# Patient Record
Sex: FEMALE | Race: OTHER | HISPANIC OR LATINO | URBAN - METROPOLITAN AREA
[De-identification: names, ages, dates, MRNs, and addresses within clinical notes are randomized per-mention and may not be internally consistent; named-entity substitution may affect disease eponyms.]

---

## 2020-03-31 ENCOUNTER — EMERGENCY (EMERGENCY)
Facility: HOSPITAL | Age: 30
LOS: 0 days | Discharge: HOME | End: 2020-04-01
Admitting: EMERGENCY MEDICINE
Payer: COMMERCIAL

## 2020-03-31 VITALS
DIASTOLIC BLOOD PRESSURE: 70 MMHG | SYSTOLIC BLOOD PRESSURE: 102 MMHG | TEMPERATURE: 101 F | HEART RATE: 128 BPM | RESPIRATION RATE: 20 BRPM | OXYGEN SATURATION: 100 %

## 2020-03-31 DIAGNOSIS — R07.89 OTHER CHEST PAIN: ICD-10-CM

## 2020-03-31 DIAGNOSIS — J06.9 ACUTE UPPER RESPIRATORY INFECTION, UNSPECIFIED: ICD-10-CM

## 2020-03-31 DIAGNOSIS — R05 COUGH: ICD-10-CM

## 2020-03-31 PROCEDURE — 99284 EMERGENCY DEPT VISIT MOD MDM: CPT

## 2020-03-31 RX ORDER — SODIUM CHLORIDE 9 MG/ML
1000 INJECTION, SOLUTION INTRAVENOUS ONCE
Refills: 0 | Status: COMPLETED | OUTPATIENT
Start: 2020-03-31 | End: 2020-03-31

## 2020-03-31 RX ORDER — ONDANSETRON 8 MG/1
4 TABLET, FILM COATED ORAL ONCE
Refills: 0 | Status: COMPLETED | OUTPATIENT
Start: 2020-03-31 | End: 2020-03-31

## 2020-03-31 RX ORDER — ACETAMINOPHEN 500 MG
975 TABLET ORAL ONCE
Refills: 0 | Status: COMPLETED | OUTPATIENT
Start: 2020-03-31 | End: 2020-03-31

## 2020-03-31 RX ADMIN — ONDANSETRON 4 MILLIGRAM(S): 8 TABLET, FILM COATED ORAL at 22:36

## 2020-03-31 RX ADMIN — SODIUM CHLORIDE 1000 MILLILITER(S): 9 INJECTION, SOLUTION INTRAVENOUS at 22:35

## 2020-03-31 RX ADMIN — Medication 975 MILLIGRAM(S): at 22:30

## 2020-03-31 RX ADMIN — SODIUM CHLORIDE 1000 MILLILITER(S): 9 INJECTION, SOLUTION INTRAVENOUS at 23:40

## 2020-03-31 NOTE — ED ADULT NURSE NOTE - OBJECTIVE STATEMENT
Pt with complaints of diarrhea more than a [place] ago and cough, fever, body aches, SOB and nausea/vomiting for a week.

## 2020-03-31 NOTE — ED ADULT NURSE NOTE - CINV DISCH TEACH PARTICIP
Shift assessment completed. Reviewed labs and plan of care. Patient is alert and oriented. Lungs are diminished. NSR on monitor, no murmurs. Bowel sounds active. No edema noted. Denies pain at this time. This RN at bedside for suicide precaution. Patient denies the want to hurt himself at this time, but is very tearful. Will continue to monitor closely. Patient

## 2020-04-01 VITALS
RESPIRATION RATE: 20 BRPM | OXYGEN SATURATION: 97 % | SYSTOLIC BLOOD PRESSURE: 108 MMHG | HEART RATE: 108 BPM | TEMPERATURE: 100 F | DIASTOLIC BLOOD PRESSURE: 65 MMHG

## 2020-04-01 PROCEDURE — 71045 X-RAY EXAM CHEST 1 VIEW: CPT | Mod: 26

## 2020-04-01 RX ORDER — ACETAMINOPHEN 500 MG
1 TABLET ORAL
Qty: 15 | Refills: 0
Start: 2020-04-01 | End: 2020-04-05

## 2020-04-01 RX ORDER — ONDANSETRON 8 MG/1
1 TABLET, FILM COATED ORAL
Qty: 10 | Refills: 0
Start: 2020-04-01 | End: 2020-04-05

## 2020-04-01 NOTE — ED PROVIDER NOTE - NS ED ROS FT
Constitutional: (+) fever  Eyes/ENT: (-) visual changes   Cardiovascular: (-) chest pain, (-) syncope  Respiratory: (+) cough, (+) shortness of breath  Gastrointestinal: (+) vomiting, (+) diarrhea  Genitourinary: (-) dysuria, (-) hesitancy, (-) frequency   Musculoskeletal: (-) neck pain, (-) back pain, (-) joint pain, body aches   Integumentary: (-) rash, (-) edema  Neurological: (-) headache, (-) altered mental status  Allergic/Immunologic: (-) pruritus

## 2020-04-01 NOTE — ED PROVIDER NOTE - CLINICAL SUMMARY MEDICAL DECISION MAKING FREE TEXT BOX
33 yo male presents with viral illness. 30 yo male presents with viral illness. 30 yo male presents with viral illness. chest xray shows for viral pna. pt states to feel better after zofran and fluids. pt ablen to tolerate PO. will send zofran to pharmacy for nausea. given strict return precautions.

## 2020-04-01 NOTE — ED PROVIDER NOTE - NSFOLLOWUPINSTRUCTIONS_ED_ALL_ED_FT
Please follow up with your primary care physician within 24-72 hours and return immediately if symptoms worsen.    Novel Coronavirus (COVID-19)  The Facts  What is a coronavirus?  Coronaviruses are a large family of viruses that cause illnesses ranging from the common cold  to more severe diseases such as Middle East Respiratory Syndrome (MERS) and Severe Acute  Respiratory Syndrome (SARS).  What is Novel Coronavirus (COVID-19)?  COVID-19 is a new strain of Coronavirus that has not been previously identified in humans. COVID-19  was identified in [place], [place], [place] in December 2019 (COVID-19). COVID-19 has  since been identified outside of [place], in a growing number of countries internationally, including  the [place].  Where can I find the most recent information about COVID-19?  The Centers for Disease Control and Prevention (CDC) is closely monitoring the outbreak caused by the  COVID-19. For the latest information about COVID- 19, visit the CDC website at  https://www.cdc.gov/coronavirus/index.html  How are coronaviruses spread?  Coronaviruses can be transmitted from person-to- person, usually after close contact with an infected person,  for example, in a household, workplace, or healthcare setting via droplets that become airborne after a cough  or sneeze by an affected person. These droplets can then infect a nearby person. It is likely transmission also  occurs by touching recently contaminated surfaces.  What are the symptoms of coronavirus infection?  It depends on the virus, but common signs include fever and/or respiratory symptoms such as  cough and shortness of breath. In more severe cases, infection can cause pneumonia, severe acute  respiratory syndrome, kidney failure and even death. Fortunately, most cases of COVID-19 have an  illness no different than the influenza “flu”. With a majority of these patients having mild symptoms  and overall mortality which appears to be not much different than the flu.  Is there a treatment for a COVID-19?  There is no specific treatment for disease caused by COVID-19. However, many of the symptoms can  be treated based on the patient’s clinical condition. Supportive care for infected persons can be highly  effective.  What can I do to protect myself?  Washing your hands, covering your cough, and disinfecting surfaces are the best precautionary  measures. It is also advisable to avoid close contact with anyone showing symptoms of respiratory  illness such as coughing and sneezing. Those with symptoms should wear a surgical mask when  around others.  What can I do to protect those around me?  If you have been identified as someone who may be infected with COVID-19, we recommend you  follow the self-isolation procedures outlined below to protect those around you and limit the spread  of this virus.   March 3, 2020  Recommendations for Patients Advised to Self-Isolate  for Possible COVID-19 Exposure  We recommend the below precautionary steps from now until 14 days from when you  returned from your travel or date of your last known possible contact:  - Do not go to work, school, or public areas. Avoid using public transportation, ride-sharing, or  taxis.  - As much as possible, separate yourself from other people in your home. If you can, you should  stay in a room and away from other people in your home. Also, you should use a separate  bathroom, if available.  - Wear the supplied mask whenever you are around other people.  - If you have a non-urgent medical appointment, please reschedule for a later date. If the  appointment is urgent, please call the healthcare provider and tell them that you are on selfisolation for possible COVID-19. This will help the healthcare provider’s office take steps to keep  other people from getting infected or exposed. If you can reschedule routine appointments, do  so.  - Wash your hands often with soap and water for at least 15 to 20 seconds or clean your hands  with an alcohol-based hand  that contains 60 to 95% alcohol, covering all surfaces of  your hands and rubbing them together until they feel dry. Soap and water should be used  preferentially if hands are visibly dirty.  - Cover your mouth and nose with a tissue when you cough or sneeze. Throw used tissues in a  lined trash can; immediately wash your hands.  - Avoid touching your eyes, nose, and mouth with your hands.  - Avoid sharing personal household items. You should not share dishes, drinking glasses, cups,  eating utensils, towels, or bedding with other people or pets in your home. After using these  items, they should be washed thoroughly with soap and water.  - Clean and disinfect all “high-touch” surfaces every day. High touch surfaces include counters,  tabletops, doorknobs, light switches, remote controls, bathroom fixtures, toilets, phones,  keyboards, tablets, and bedside tables. Also, clean any surfaces that may have blood, stool, or  body fluids on them.       If you develop worsening symptoms:  - If you develop worsening symptoms, such as severe shortness of breath, please call [phone] option #[phone]. They will assist you in determining your next steps.  During your time on self-isolation do the following:  - Work from home if you are able to so.  - Limit social isolation by talking with friends and family on the phone or with face-time  - Talk with friends and relatives who don’t live with you about supporting each other if one  household has to be quarantined. For example, agree to drop groceries or other supplies at the  front door.  - Exercise and spend time outdoors away from others if able to do so.    Why didn’t I get tested for novel coronavirus (COVID-19)?  The number of available tests is very limited so strict rules exist for who is allowed to be tested.  [facility] has been authorized to perform testing and is currently working hard to be  able to start providing the test. Such testing is currently reserved for patients who have had  contact with someone infected with the virus, or those who are very sick a plus those who have  traveled to areas identified by the Centers for Disease Control and Prevention (CD) and will  require hospitalization.  What should I do now?  If you are well enough to be discharged home and are not in a high risk group to have  contracted the COVID-10, you should care for yourself at home exactly like you would if you  have Influenza “flu”. Follow all the standard guidelines about washing your hands, covering  your cough, etc.  You should return to the Emergency Department if you develop worse symptoms, trouble  breathing, chest pain, and/or a fever that doesn’t improve with over the counter  acetaminophen or ibuprofen.

## 2020-04-01 NOTE — ED PROVIDER NOTE - PATIENT PORTAL LINK FT
You can access the FollowMyHealth Patient Portal offered by [facility] by registering at the following website: [email or web address]. By joining [employer]’s FollowMyHealth portal, you will also be able to view your health information using other applications (apps) compatible with our system.

## 2020-04-01 NOTE — ED PROVIDER NOTE - OBJECTIVE STATEMENT
28 yo female with no pertinent pmh presents c/o fever/cough/SOB/n/v/d for one week. pt states to have body aches and generalized chest pain only with cough. pt state to have a contact with a COVID+ individual. NBNB emesis and NB stools. denies any other symptoms including atypical headache, visual changes, recent illness/travel, or abdominal pain.

## 2020-12-19 ENCOUNTER — EMERGENCY (EMERGENCY)
Facility: HOSPITAL | Age: 30
LOS: 0 days | Discharge: HOME | End: 2020-12-20
Attending: EMERGENCY MEDICINE | Admitting: EMERGENCY MEDICINE
Payer: COMMERCIAL

## 2020-12-19 VITALS
SYSTOLIC BLOOD PRESSURE: 126 MMHG | TEMPERATURE: 98 F | OXYGEN SATURATION: 98 % | WEIGHT: 184.97 LBS | DIASTOLIC BLOOD PRESSURE: 60 MMHG | HEART RATE: 93 BPM | RESPIRATION RATE: 16 BRPM

## 2020-12-19 DIAGNOSIS — R10.32 LEFT LOWER QUADRANT PAIN: ICD-10-CM

## 2020-12-19 DIAGNOSIS — R10.9 UNSPECIFIED ABDOMINAL PAIN: ICD-10-CM

## 2020-12-19 DIAGNOSIS — K82.9 DISEASE OF GALLBLADDER, UNSPECIFIED: ICD-10-CM

## 2020-12-19 LAB
ALBUMIN SERPL ELPH-MCNC: 4.2 G/DL — SIGNIFICANT CHANGE UP (ref 3.5–5.2)
ALP SERPL-CCNC: 45 U/L — SIGNIFICANT CHANGE UP (ref 30–115)
ALT FLD-CCNC: 14 U/L — SIGNIFICANT CHANGE UP (ref 0–41)
ANION GAP SERPL CALC-SCNC: 12 MMOL/L — SIGNIFICANT CHANGE UP (ref 7–14)
APPEARANCE UR: CLEAR — SIGNIFICANT CHANGE UP
AST SERPL-CCNC: 18 U/L — SIGNIFICANT CHANGE UP (ref 0–41)
BACTERIA # UR AUTO: NEGATIVE — SIGNIFICANT CHANGE UP
BASOPHILS # BLD AUTO: 0.03 K/UL — SIGNIFICANT CHANGE UP (ref 0–0.2)
BASOPHILS NFR BLD AUTO: 0.3 % — SIGNIFICANT CHANGE UP (ref 0–1)
BILIRUB SERPL-MCNC: <0.2 MG/DL — SIGNIFICANT CHANGE UP (ref 0.2–1.2)
BILIRUB UR-MCNC: NEGATIVE — SIGNIFICANT CHANGE UP
BUN SERPL-MCNC: 10 MG/DL — SIGNIFICANT CHANGE UP (ref 10–20)
CALCIUM SERPL-MCNC: 9.5 MG/DL — SIGNIFICANT CHANGE UP (ref 8.5–10.1)
CHLORIDE SERPL-SCNC: 102 MMOL/L — SIGNIFICANT CHANGE UP (ref 98–110)
CO2 SERPL-SCNC: 22 MMOL/L — SIGNIFICANT CHANGE UP (ref 17–32)
COLOR SPEC: SIGNIFICANT CHANGE UP
CREAT SERPL-MCNC: 0.7 MG/DL — SIGNIFICANT CHANGE UP (ref 0.7–1.5)
DIFF PNL FLD: ABNORMAL
EOSINOPHIL # BLD AUTO: 0.1 K/UL — SIGNIFICANT CHANGE UP (ref 0–0.7)
EOSINOPHIL NFR BLD AUTO: 0.9 % — SIGNIFICANT CHANGE UP (ref 0–8)
EPI CELLS # UR: 2 /HPF — SIGNIFICANT CHANGE UP (ref 0–5)
GLUCOSE SERPL-MCNC: 111 MG/DL — HIGH (ref 70–99)
GLUCOSE UR QL: NEGATIVE — SIGNIFICANT CHANGE UP
HCG SERPL QL: NEGATIVE — SIGNIFICANT CHANGE UP
HCT VFR BLD CALC: 41.4 % — SIGNIFICANT CHANGE UP (ref 37–47)
HGB BLD-MCNC: 14.2 G/DL — SIGNIFICANT CHANGE UP (ref 12–16)
HYALINE CASTS # UR AUTO: 1 /LPF — SIGNIFICANT CHANGE UP (ref 0–7)
IMM GRANULOCYTES NFR BLD AUTO: 0.2 % — SIGNIFICANT CHANGE UP (ref 0.1–0.3)
KETONES UR-MCNC: NEGATIVE — SIGNIFICANT CHANGE UP
LEUKOCYTE ESTERASE UR-ACNC: NEGATIVE — SIGNIFICANT CHANGE UP
LYMPHOCYTES # BLD AUTO: 2.97 K/UL — SIGNIFICANT CHANGE UP (ref 1.2–3.4)
LYMPHOCYTES # BLD AUTO: 25.9 % — SIGNIFICANT CHANGE UP (ref 20.5–51.1)
MCHC RBC-ENTMCNC: 31.2 PG — HIGH (ref 27–31)
MCHC RBC-ENTMCNC: 34.3 G/DL — SIGNIFICANT CHANGE UP (ref 32–37)
MCV RBC AUTO: 91 FL — SIGNIFICANT CHANGE UP (ref 81–99)
MONOCYTES # BLD AUTO: 0.59 K/UL — SIGNIFICANT CHANGE UP (ref 0.1–0.6)
MONOCYTES NFR BLD AUTO: 5.1 % — SIGNIFICANT CHANGE UP (ref 1.7–9.3)
NEUTROPHILS # BLD AUTO: 7.77 K/UL — HIGH (ref 1.4–6.5)
NEUTROPHILS NFR BLD AUTO: 67.6 % — SIGNIFICANT CHANGE UP (ref 42.2–75.2)
NITRITE UR-MCNC: NEGATIVE — SIGNIFICANT CHANGE UP
NRBC # BLD: 0 /100 WBCS — SIGNIFICANT CHANGE UP (ref 0–0)
PH UR: 7 — SIGNIFICANT CHANGE UP (ref 5–8)
PLATELET # BLD AUTO: 383 K/UL — SIGNIFICANT CHANGE UP (ref 130–400)
POTASSIUM SERPL-MCNC: 4 MMOL/L — SIGNIFICANT CHANGE UP (ref 3.5–5)
POTASSIUM SERPL-SCNC: 4 MMOL/L — SIGNIFICANT CHANGE UP (ref 3.5–5)
PROT SERPL-MCNC: 7.6 G/DL — SIGNIFICANT CHANGE UP (ref 6–8)
PROT UR-MCNC: SIGNIFICANT CHANGE UP
RBC # BLD: 4.55 M/UL — SIGNIFICANT CHANGE UP (ref 4.2–5.4)
RBC # FLD: 12.6 % — SIGNIFICANT CHANGE UP (ref 11.5–14.5)
RBC CASTS # UR COMP ASSIST: 2 /HPF — SIGNIFICANT CHANGE UP (ref 0–4)
SODIUM SERPL-SCNC: 136 MMOL/L — SIGNIFICANT CHANGE UP (ref 135–146)
SP GR SPEC: 1.02 — SIGNIFICANT CHANGE UP (ref 1.01–1.03)
UROBILINOGEN FLD QL: SIGNIFICANT CHANGE UP
WBC # BLD: 11.48 K/UL — HIGH (ref 4.8–10.8)
WBC # FLD AUTO: 11.48 K/UL — HIGH (ref 4.8–10.8)
WBC UR QL: 0 /HPF — SIGNIFICANT CHANGE UP (ref 0–5)

## 2020-12-19 PROCEDURE — 99285 EMERGENCY DEPT VISIT HI MDM: CPT

## 2020-12-19 PROCEDURE — 74177 CT ABD & PELVIS W/CONTRAST: CPT | Mod: 26

## 2020-12-19 RX ORDER — ACETAMINOPHEN 500 MG
975 TABLET ORAL ONCE
Refills: 0 | Status: COMPLETED | OUTPATIENT
Start: 2020-12-19 | End: 2020-12-19

## 2020-12-19 RX ADMIN — Medication 975 MILLIGRAM(S): at 20:39

## 2020-12-19 NOTE — ED PROVIDER NOTE - PATIENT PORTAL LINK FT
You can access the FollowMyHealth Patient Portal offered by [facility] by registering at the following website: [email or web address]. By joining [employer]’s FollowMyHealth portal, you will also be able to view your health information using other applications (apps) compatible with our system.

## 2020-12-19 NOTE — ED PROVIDER NOTE - PROGRESS NOTE DETAILS
SC: Labs, CT, UA, and US reassuring. PT feeling better on reexamination. Strict return precautions advised. Patient to be discharged from ED. Any available test results were discussed with patient and/or family. Verbal instructions given, including instructions to return to ED immediately for any new, worsening, or concerning symptoms. Patient endorsed understanding. Written discharge instructions additionally given, including follow-up plan. Results of all test including incidental findings were d/ w the patient.  Advised to follow  up with PMD in 2-3 days.

## 2020-12-19 NOTE — ED PROVIDER NOTE - PHYSICAL EXAMINATION
CONSTITUTIONAL: in no acute distress.   SKIN: warm, dry  HEAD: Normocephalic.  EYES: PERRL.  ENT: Airway clear.  NECK: Supple.  LYMPH: No acute cervical adenopathy.  CARD: Regular rate and rhythm.   RESP: No wheezing, rales or rhonchi.  ABD: soft, TTP LLQ, +CVA tenderness on L, no rebound or guarding  EXT: No clubbing, cyanosis.   NEURO: Alert, oriented.  PSYCH: Cooperative, appropriate. CONSTITUTIONAL: in no acute distress.   SKIN: warm, dry  HEAD: Normocephalic.  EYES: PERRL.  ENT: Airway clear.  NECK: Supple.  LYMPH: No acute cervical adenopathy.  CARD: Regular rate and rhythm.   RESP: No wheezing, rales or rhonchi.  ABD: soft, TTP LLQ, +CVA tenderness on L, no rebound or guarding  EXT: No clubbing, cyanosis.   NEURO: Alert, oriented.  PSYCH: Cooperative, appropriate.    GYN: no CMT, no discharge or masses, os closed.

## 2020-12-19 NOTE — ED PROVIDER NOTE - OBJECTIVE STATEMENT
30F with pmh of nephrolithiasis presents with LLQ abd pain radiating to the L flank beginning last night, described as moderate, throbbing, intermittent, worse with movement. Denies fever, chills, dysuria, discharge, new sexual partners, n/v/d. LMP 1 wk ago.

## 2020-12-19 NOTE — ED PROVIDER NOTE - NSFOLLOWUPINSTRUCTIONS_ED_ALL_ED_FT
Follow up with your PCP or the [facility] clinic for outpatient ultrasound of the gallbladder.    Abdominal Pain    Many things can cause abdominal pain. Many times, abdominal pain is not caused by a disease and will improve without treatment. Your health care provider will do a physical exam to determine if there is a dangerous cause of your pain; blood tests and imaging may help determine the cause of your pain. However, in many cases, no cause may be found and you may need further testing as an outpatient. Monitor your abdominal pain for any changes.     SEEK IMMEDIATE MEDICAL CARE IF YOU HAVE ANY OF THE FOLLOWING SYMPTOMS: worsening abdominal pain, uncontrollable vomiting, profuse diarrhea, inability to have bowel movements or pass gas, black or bloody stools, fever accompanying chest pain or back pain, or fainting. These symptoms may represent a serious problem that is an emergency. Do not wait to see if the symptoms will go away. Get medical help right away. Call 911 and do not drive yourself to the hospital.

## 2020-12-19 NOTE — ED PROVIDER NOTE - CLINICAL SUMMARY MEDICAL DECISION MAKING FREE TEXT BOX
31 yo female remote h/o kidneys stones  c/o left throbbing left sided abdominal pain radiating to her left flank intermittently since last night.  No clear alleviating factors, worse with sitting down, associated with intermittent nausea.  Denies, fever, chills vomiting, CP, SOB, focal weakness or paresthesias, no vaginal bleeding or discharge, no rectal pain or any other additional complaints.   Well-appearing well-nourished, NAD, head AT/NC, PERRL, pink conjunctivae,  mmm, nml oropharynx, nml phonation without drooling or trismus, supple neck without midline spine ttp, nml work of breathing, lungs CTA b/l, equal air entry, RRR, well-perfused extremities, distal pulses intact, abdomen soft, ND, + LLQ ttp without rebound or guarding, BS present in all quadrants, no midline spine ttp, + left CVA ttp,, no leg edema or unilateral calf swelling, A&Ox3, no focal neuro deficits, nml mood and affect.  Will check labs, CT abd/pelvis r/o kidney stone, reassess.  Patient is amenable with the plan.

## 2020-12-19 NOTE — ED PROVIDER NOTE - NSFOLLOWUPCLINICS_GEN_ALL_ED_FT
[facility]  Medicine  [address], [place]   Phone: [phone]  Fax:   Follow Up Time: 1-3 Days

## 2020-12-20 LAB
CULTURE RESULTS: SIGNIFICANT CHANGE UP
SPECIMEN SOURCE: SIGNIFICANT CHANGE UP

## 2020-12-20 PROCEDURE — 76830 TRANSVAGINAL US NON-OB: CPT | Mod: 26

## 2020-12-21 LAB
C TRACH RRNA SPEC QL NAA+PROBE: SIGNIFICANT CHANGE UP
N GONORRHOEA RRNA SPEC QL NAA+PROBE: SIGNIFICANT CHANGE UP
SPECIMEN SOURCE: SIGNIFICANT CHANGE UP

## 2021-02-18 ENCOUNTER — TRANSCRIPTION ENCOUNTER (OUTPATIENT)
Age: 31
End: 2021-02-18

## 2022-07-11 ENCOUNTER — APPOINTMENT (OUTPATIENT)
Dept: ORTHOPEDIC SURGERY | Facility: CLINIC | Age: 32
End: 2022-07-11

## 2022-07-19 ENCOUNTER — INPATIENT (INPATIENT)
Facility: HOSPITAL | Age: 32
LOS: 3 days | Discharge: HOME | End: 2022-07-23
Attending: SURGERY | Admitting: SURGERY

## 2022-07-19 VITALS
TEMPERATURE: 98 F | RESPIRATION RATE: 18 BRPM | WEIGHT: 186.95 LBS | DIASTOLIC BLOOD PRESSURE: 59 MMHG | SYSTOLIC BLOOD PRESSURE: 127 MMHG | HEART RATE: 84 BPM | OXYGEN SATURATION: 100 %

## 2022-07-19 DIAGNOSIS — E28.2 POLYCYSTIC OVARIAN SYNDROME: ICD-10-CM

## 2022-07-19 DIAGNOSIS — E83.42 HYPOMAGNESEMIA: ICD-10-CM

## 2022-07-19 DIAGNOSIS — K83.8 OTHER SPECIFIED DISEASES OF BILIARY TRACT: ICD-10-CM

## 2022-07-19 DIAGNOSIS — T36.8X5A ADVERSE EFFECT OF OTHER SYSTEMIC ANTIBIOTICS, INITIAL ENCOUNTER: ICD-10-CM

## 2022-07-19 DIAGNOSIS — D01.5 CARCINOMA IN SITU OF LIVER, GALLBLADDER AND BILE DUCTS: ICD-10-CM

## 2022-07-19 DIAGNOSIS — G43.909 MIGRAINE, UNSPECIFIED, NOT INTRACTABLE, WITHOUT STATUS MIGRAINOSUS: ICD-10-CM

## 2022-07-19 DIAGNOSIS — K80.00 CALCULUS OF GALLBLADDER WITH ACUTE CHOLECYSTITIS WITHOUT OBSTRUCTION: ICD-10-CM

## 2022-07-19 DIAGNOSIS — R74.01 ELEVATION OF LEVELS OF LIVER TRANSAMINASE LEVELS: ICD-10-CM

## 2022-07-19 DIAGNOSIS — E78.5 HYPERLIPIDEMIA, UNSPECIFIED: ICD-10-CM

## 2022-07-19 DIAGNOSIS — Z20.822 CONTACT WITH AND (SUSPECTED) EXPOSURE TO COVID-19: ICD-10-CM

## 2022-07-19 DIAGNOSIS — L27.0 GENERALIZED SKIN ERUPTION DUE TO DRUGS AND MEDICAMENTS TAKEN INTERNALLY: ICD-10-CM
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
## 2022-07-19 DIAGNOSIS — Z88.1 ALLERGY STATUS TO OTHER ANTIBIOTIC AGENTS STATUS: ICD-10-CM

## 2022-07-19 DIAGNOSIS — E66.9 OBESITY, UNSPECIFIED: ICD-10-CM

## 2022-07-19 LAB
BASOPHILS # BLD AUTO: 0.03 K/UL — SIGNIFICANT CHANGE UP (ref 0–0.2)
BASOPHILS NFR BLD AUTO: 0.2 % — SIGNIFICANT CHANGE UP (ref 0–1)
EOSINOPHIL # BLD AUTO: 0.05 K/UL — SIGNIFICANT CHANGE UP (ref 0–0.7)
EOSINOPHIL NFR BLD AUTO: 0.3 % — SIGNIFICANT CHANGE UP (ref 0–8)
HCT VFR BLD CALC: 40.4 % — SIGNIFICANT CHANGE UP (ref 37–47)
HGB BLD-MCNC: 14.2 G/DL — SIGNIFICANT CHANGE UP (ref 12–16)
IMM GRANULOCYTES NFR BLD AUTO: 0.6 % — HIGH (ref 0.1–0.3)
LYMPHOCYTES # BLD AUTO: 16.3 % — LOW (ref 20.5–51.1)
LYMPHOCYTES # BLD AUTO: 2.92 K/UL — SIGNIFICANT CHANGE UP (ref 1.2–3.4)
MCHC RBC-ENTMCNC: 31.9 PG — HIGH (ref 27–31)
MCHC RBC-ENTMCNC: 35.1 G/DL — SIGNIFICANT CHANGE UP (ref 32–37)
MCV RBC AUTO: 90.8 FL — SIGNIFICANT CHANGE UP (ref 81–99)
MONOCYTES # BLD AUTO: 0.97 K/UL — HIGH (ref 0.1–0.6)
MONOCYTES NFR BLD AUTO: 5.4 % — SIGNIFICANT CHANGE UP (ref 1.7–9.3)
NEUTROPHILS # BLD AUTO: 13.88 K/UL — HIGH (ref 1.4–6.5)
NEUTROPHILS NFR BLD AUTO: 77.2 % — HIGH (ref 42.2–75.2)
NRBC # BLD: 0 /100 WBCS — SIGNIFICANT CHANGE UP (ref 0–0)
PLATELET # BLD AUTO: 353 K/UL — SIGNIFICANT CHANGE UP (ref 130–400)
RBC # BLD: 4.45 M/UL — SIGNIFICANT CHANGE UP (ref 4.2–5.4)
RBC # FLD: 12.6 % — SIGNIFICANT CHANGE UP (ref 11.5–14.5)
WBC # BLD: 17.95 K/UL — HIGH (ref 4.8–10.8)
WBC # FLD AUTO: 17.95 K/UL — HIGH (ref 4.8–10.8)

## 2022-07-19 PROCEDURE — 99285 EMERGENCY DEPT VISIT HI MDM: CPT

## 2022-07-19 RX ORDER — ONDANSETRON 8 MG/1
4 TABLET, FILM COATED ORAL ONCE
Refills: 0 | Status: COMPLETED | OUTPATIENT
Start: 2022-07-19 | End: 2022-07-19

## 2022-07-19 RX ORDER — FAMOTIDINE 10 MG/ML
20 INJECTION INTRAVENOUS ONCE
Refills: 0 | Status: COMPLETED | OUTPATIENT
Start: 2022-07-19 | End: 2022-07-19

## 2022-07-19 RX ORDER — SODIUM CHLORIDE 9 MG/ML
1000 INJECTION INTRAMUSCULAR; INTRAVENOUS; SUBCUTANEOUS ONCE
Refills: 0 | Status: COMPLETED | OUTPATIENT
Start: 2022-07-19 | End: 2022-07-19

## 2022-07-19 RX ORDER — MORPHINE SULFATE 50 MG/1
4 CAPSULE, EXTENDED RELEASE ORAL ONCE
Refills: 0 | Status: DISCONTINUED | OUTPATIENT
Start: 2022-07-19 | End: 2022-07-19

## 2022-07-19 RX ADMIN — ONDANSETRON 4 MILLIGRAM(S): 8 TABLET, FILM COATED ORAL at 23:52

## 2022-07-19 RX ADMIN — MORPHINE SULFATE 4 MILLIGRAM(S): 50 CAPSULE, EXTENDED RELEASE ORAL at 23:41

## 2022-07-19 RX ADMIN — SODIUM CHLORIDE 1000 MILLILITER(S): 9 INJECTION INTRAMUSCULAR; INTRAVENOUS; SUBCUTANEOUS at 23:52

## 2022-07-19 RX ADMIN — FAMOTIDINE 20 MILLIGRAM(S): 10 INJECTION INTRAVENOUS at 23:52

## 2022-07-19 NOTE — ED PROVIDER NOTE - ATTENDING APP SHARED VISIT CONTRIBUTION OF CARE
pt with ruq pain rad to right flank. +n, v. no fever. no cp or sob. no urinary sx.    pt in mod distress, appears colicky.  anict, ctab, rrr, ab soft, tender ruq. no cvat.     labs, imaging, pain meds, supportive care  reassess

## 2022-07-19 NOTE — ED PROVIDER NOTE - NS ED ATTENDING STATEMENT MOD
This was a shared visit with the RASTA. I reviewed and verified the documentation and independently performed the documented:

## 2022-07-19 NOTE — ED PROVIDER NOTE - NS ED ROS FT
Review of Systems  Constitutional:  No fever, chills.  Eyes:  No visual changes, eye pain, or discharge.  ENMT:  No hearing changes, pain, or discharge. No nasal congestion, discharge, or bleeding. No throat pain, swelling, or difficulty swallowing.  Cardiac:  No chest pain, palpitations, syncope, or edema.  Respiratory:  No dyspnea, cough. No hemoptysis.  GI:  No diarrhea. (+) abd pain, nausea, vomiting  :  No dysuria, hematuria, frequency, or burning.   MS:  No back pain.  Skin:  No skin rash, pruritis, jaundice, or lesions.  Neuro:  No headache, dizziness, loss of sensation, or focal weakness.  No change in mental status.

## 2022-07-19 NOTE — ED PROVIDER NOTE - CARE PLAN
1 Principal Discharge DX:	Abdominal pain  Secondary Diagnosis:	Dilation of common bile duct  Secondary Diagnosis:	Transaminitis

## 2022-07-19 NOTE — ED PROVIDER NOTE - PHYSICAL EXAMINATION
VITAL SIGNS: I have reviewed nursing notes and confirm.  CONSTITUTIONAL: Well-developed; well-nourished; in moderate distress 2/2 pain.  SKIN: Skin exam is warm and dry, no acute rash.  HEAD: Normocephalic; atraumatic.  EYES: Conjunctiva and sclera clear.  ENT: No nasal discharge; airway clear.   CARD: S1, S2 normal; no murmurs, gallops, or rubs. Regular rate and rhythm.  RESP: No wheezes, rales or rhonchi. Speaking in full sentences.   ABD: Normal bowel sounds; soft; non-distended; (+) epigastric, RUQ TTP; No rebound or guarding. No CVA tenderness.  EXT: Normal ROM. No clubbing, cyanosis or edema.  NEURO: Alert, oriented. Grossly unremarkable. No focal deficits.

## 2022-07-19 NOTE — ED PROVIDER NOTE - OBJECTIVE STATEMENT
30 yo F with PMHx of migraines, PCOS and HLD presents to the ED c/o progressively worsening RUQ pain that started 3 days ago. Pain is sharp, intermittent,  non-radiating, associated with nausea and one episode of NBNB vomiting. Pt denies hx of similar symptoms in the past. She denies aggravating/alleviating factors. She denies hx of similar pain in the past. She denies other complaints. Pt denies fever, chills, diarrhea, headache, dizziness, weakness, chest pain, SOB, back pain, LOC, trauma, urinary symptoms, cough, calf pain/swelling, recent travel, recent surgery.

## 2022-07-20 DIAGNOSIS — Z90.49 ACQUIRED ABSENCE OF OTHER SPECIFIED PARTS OF DIGESTIVE TRACT: Chronic | ICD-10-CM

## 2022-07-20 LAB
A1C WITH ESTIMATED AVERAGE GLUCOSE RESULT: 5.5 % — SIGNIFICANT CHANGE UP (ref 4–5.6)
ALBUMIN SERPL ELPH-MCNC: 4.1 G/DL — SIGNIFICANT CHANGE UP (ref 3.5–5.2)
ALBUMIN SERPL ELPH-MCNC: 4.6 G/DL — SIGNIFICANT CHANGE UP (ref 3.5–5.2)
ALP SERPL-CCNC: 116 U/L — HIGH (ref 30–115)
ALP SERPL-CCNC: 71 U/L — SIGNIFICANT CHANGE UP (ref 30–115)
ALT FLD-CCNC: 109 U/L — HIGH (ref 0–41)
ALT FLD-CCNC: 677 U/L — HIGH (ref 0–41)
ANION GAP SERPL CALC-SCNC: 11 MMOL/L — SIGNIFICANT CHANGE UP (ref 7–14)
ANION GAP SERPL CALC-SCNC: 12 MMOL/L — SIGNIFICANT CHANGE UP (ref 7–14)
APPEARANCE UR: CLEAR — SIGNIFICANT CHANGE UP
APTT BLD: 31.5 SEC — SIGNIFICANT CHANGE UP (ref 27–39.2)
AST SERPL-CCNC: 119 U/L — HIGH (ref 0–41)
AST SERPL-CCNC: 672 U/L — HIGH (ref 0–41)
BACTERIA # UR AUTO: NEGATIVE — SIGNIFICANT CHANGE UP
BILIRUB DIRECT SERPL-MCNC: <0.2 MG/DL — SIGNIFICANT CHANGE UP (ref 0–0.3)
BILIRUB INDIRECT FLD-MCNC: >0.2 MG/DL — SIGNIFICANT CHANGE UP (ref 0.2–1.2)
BILIRUB SERPL-MCNC: 0.4 MG/DL — SIGNIFICANT CHANGE UP (ref 0.2–1.2)
BILIRUB SERPL-MCNC: 1.6 MG/DL — HIGH (ref 0.2–1.2)
BILIRUB UR-MCNC: NEGATIVE — SIGNIFICANT CHANGE UP
BUN SERPL-MCNC: 13 MG/DL — SIGNIFICANT CHANGE UP (ref 10–20)
BUN SERPL-MCNC: 9 MG/DL — LOW (ref 10–20)
CALCIUM SERPL-MCNC: 10.1 MG/DL — SIGNIFICANT CHANGE UP (ref 8.5–10.1)
CALCIUM SERPL-MCNC: 9.3 MG/DL — SIGNIFICANT CHANGE UP (ref 8.5–10.1)
CHLORIDE SERPL-SCNC: 103 MMOL/L — SIGNIFICANT CHANGE UP (ref 98–110)
CHLORIDE SERPL-SCNC: 104 MMOL/L — SIGNIFICANT CHANGE UP (ref 98–110)
CHOLEST SERPL-MCNC: 205 MG/DL — HIGH
CO2 SERPL-SCNC: 24 MMOL/L — SIGNIFICANT CHANGE UP (ref 17–32)
CO2 SERPL-SCNC: 26 MMOL/L — SIGNIFICANT CHANGE UP (ref 17–32)
COLOR SPEC: SIGNIFICANT CHANGE UP
CREAT SERPL-MCNC: 0.6 MG/DL — LOW (ref 0.7–1.5)
CREAT SERPL-MCNC: 0.8 MG/DL — SIGNIFICANT CHANGE UP (ref 0.7–1.5)
DIFF PNL FLD: ABNORMAL
EGFR: 100 ML/MIN/1.73M2 — SIGNIFICANT CHANGE UP
EGFR: 122 ML/MIN/1.73M2 — SIGNIFICANT CHANGE UP
EPI CELLS # UR: 2 /HPF — SIGNIFICANT CHANGE UP (ref 0–5)
ESTIMATED AVERAGE GLUCOSE: 111 MG/DL — SIGNIFICANT CHANGE UP (ref 68–114)
GLUCOSE SERPL-MCNC: 122 MG/DL — HIGH (ref 70–99)
GLUCOSE SERPL-MCNC: 95 MG/DL — SIGNIFICANT CHANGE UP (ref 70–99)
GLUCOSE UR QL: NEGATIVE — SIGNIFICANT CHANGE UP
HCG SERPL QL: NEGATIVE — SIGNIFICANT CHANGE UP
HCT VFR BLD CALC: 39.9 % — SIGNIFICANT CHANGE UP (ref 37–47)
HDLC SERPL-MCNC: 59 MG/DL — SIGNIFICANT CHANGE UP
HGB BLD-MCNC: 13.8 G/DL — SIGNIFICANT CHANGE UP (ref 12–16)
HYALINE CASTS # UR AUTO: 0 /LPF — SIGNIFICANT CHANGE UP (ref 0–7)
INR BLD: 1.02 RATIO — SIGNIFICANT CHANGE UP (ref 0.65–1.3)
KETONES UR-MCNC: NEGATIVE — SIGNIFICANT CHANGE UP
LACTATE SERPL-SCNC: 2 MMOL/L — SIGNIFICANT CHANGE UP (ref 0.7–2)
LEUKOCYTE ESTERASE UR-ACNC: NEGATIVE — SIGNIFICANT CHANGE UP
LIDOCAIN IGE QN: 51 U/L — SIGNIFICANT CHANGE UP (ref 7–60)
LIPID PNL WITH DIRECT LDL SERPL: 126 MG/DL — HIGH
MCHC RBC-ENTMCNC: 32.2 PG — HIGH (ref 27–31)
MCHC RBC-ENTMCNC: 34.6 G/DL — SIGNIFICANT CHANGE UP (ref 32–37)
MCV RBC AUTO: 93 FL — SIGNIFICANT CHANGE UP (ref 81–99)
NITRITE UR-MCNC: NEGATIVE — SIGNIFICANT CHANGE UP
NON HDL CHOLESTEROL: 146 MG/DL — HIGH
NRBC # BLD: 0 /100 WBCS — SIGNIFICANT CHANGE UP (ref 0–0)
PH UR: 7 — SIGNIFICANT CHANGE UP (ref 5–8)
PLATELET # BLD AUTO: 300 K/UL — SIGNIFICANT CHANGE UP (ref 130–400)
POTASSIUM SERPL-MCNC: 4.6 MMOL/L — SIGNIFICANT CHANGE UP (ref 3.5–5)
POTASSIUM SERPL-MCNC: 5 MMOL/L — SIGNIFICANT CHANGE UP (ref 3.5–5)
POTASSIUM SERPL-SCNC: 4.6 MMOL/L — SIGNIFICANT CHANGE UP (ref 3.5–5)
POTASSIUM SERPL-SCNC: 5 MMOL/L — SIGNIFICANT CHANGE UP (ref 3.5–5)
PROT SERPL-MCNC: 7.4 G/DL — SIGNIFICANT CHANGE UP (ref 6–8)
PROT SERPL-MCNC: 7.9 G/DL — SIGNIFICANT CHANGE UP (ref 6–8)
PROT UR-MCNC: NEGATIVE — SIGNIFICANT CHANGE UP
PROTHROM AB SERPL-ACNC: 11.7 SEC — SIGNIFICANT CHANGE UP (ref 9.95–12.87)
RBC # BLD: 4.29 M/UL — SIGNIFICANT CHANGE UP (ref 4.2–5.4)
RBC # FLD: 13.1 % — SIGNIFICANT CHANGE UP (ref 11.5–14.5)
RBC CASTS # UR COMP ASSIST: 3 /HPF — SIGNIFICANT CHANGE UP (ref 0–4)
SARS-COV-2 RNA SPEC QL NAA+PROBE: SIGNIFICANT CHANGE UP
SODIUM SERPL-SCNC: 139 MMOL/L — SIGNIFICANT CHANGE UP (ref 135–146)
SODIUM SERPL-SCNC: 141 MMOL/L — SIGNIFICANT CHANGE UP (ref 135–146)
SP GR SPEC: 1.01 — SIGNIFICANT CHANGE UP (ref 1.01–1.03)
TRIGL SERPL-MCNC: 103 MG/DL — SIGNIFICANT CHANGE UP
UROBILINOGEN FLD QL: SIGNIFICANT CHANGE UP
WBC # BLD: 6.6 K/UL — SIGNIFICANT CHANGE UP (ref 4.8–10.8)
WBC # FLD AUTO: 6.6 K/UL — SIGNIFICANT CHANGE UP (ref 4.8–10.8)
WBC UR QL: 1 /HPF — SIGNIFICANT CHANGE UP (ref 0–5)

## 2022-07-20 PROCEDURE — 76705 ECHO EXAM OF ABDOMEN: CPT | Mod: 26

## 2022-07-20 PROCEDURE — 99253 IP/OBS CNSLTJ NEW/EST LOW 45: CPT

## 2022-07-20 PROCEDURE — 99223 1ST HOSP IP/OBS HIGH 75: CPT

## 2022-07-20 RX ORDER — ONDANSETRON 8 MG/1
4 TABLET, FILM COATED ORAL ONCE
Refills: 0 | Status: DISCONTINUED | OUTPATIENT
Start: 2022-07-20 | End: 2022-07-21

## 2022-07-20 RX ORDER — PANTOPRAZOLE SODIUM 20 MG/1
40 TABLET, DELAYED RELEASE ORAL
Refills: 0 | Status: DISCONTINUED | OUTPATIENT
Start: 2022-07-20 | End: 2022-07-20

## 2022-07-20 RX ORDER — PANTOPRAZOLE SODIUM 20 MG/1
40 TABLET, DELAYED RELEASE ORAL
Refills: 0 | Status: DISCONTINUED | OUTPATIENT
Start: 2022-07-20 | End: 2022-07-23

## 2022-07-20 RX ORDER — ACETAMINOPHEN 500 MG
650 TABLET ORAL ONCE
Refills: 0 | Status: COMPLETED | OUTPATIENT
Start: 2022-07-20 | End: 2022-07-20

## 2022-07-20 RX ORDER — ERENUMAB-AOOE 70 MG/ML
0 INJECTION SUBCUTANEOUS
Qty: 0 | Refills: 0 | DISCHARGE

## 2022-07-20 RX ADMIN — Medication 650 MILLIGRAM(S): at 11:31

## 2022-07-20 RX ADMIN — Medication 650 MILLIGRAM(S): at 12:00

## 2022-07-20 RX ADMIN — MORPHINE SULFATE 4 MILLIGRAM(S): 50 CAPSULE, EXTENDED RELEASE ORAL at 00:19

## 2022-07-20 NOTE — H&P ADULT - ASSESSMENT
31 years old, Female with PMH of Migraine, PCOS, HLD and Obesity, present to the ED with sudden onset of RUQ abdominal pain, that started suddenly 3 days ago, episodic in nature, progressively worsening, radiating to the back and left shoulder. She describes the pain as sharp in nature but was dull initially, 9/10 in intensity but gets better with pain medications. It has no aggravating or relieving factors and is associated with Nausea and Nonbilious vomiting x 1.    RUQ Pain:  #U/S abd: 7/20/22  #Cholelithiasis without definite evidence of cholecystitis. If clinically warranted, nuclear medicine HIDA scan may be of benefit. Mildly dilated CBD. MRI/M RCP may be of benefit.  # She was given N/S, Ondansetron Morphine and Famotidine in ED  # Consult GI  # Schedule MRCP  # Consult Surgery once MRCP result suggests that she needs to be operated.  # WBC was elevated at 17.95  # U/A, Lipase, Lactate negative  # AST/ ALT mildly elevated    Pain Medications:   Tylenol : Mild pain 650 mg q6 PRN  Oxycodone: 5mg, PRN, Q6  Morphine: 2mg, IV Push, Q8 hrs, PRN  Zofran: 4mg for nausea, PRN  Antibiotics: Start Zosyn in consultation with Attending and if patient is febrile    Migraines:  Continue Aimovig, confirm dose with the patient.    HLD:  # Order Lipid Profile  # Fenofibrate to be continued as per result    PCOS:  # Not on any weight loss regimen  # Check HgA1c  # F/U with ENDO/GYN as OP    Diet: Regular with fibers  DVT: None  Dispo: Home  GI ppx: Famotidine

## 2022-07-20 NOTE — H&P ADULT - NSHPSOCIALHISTORY_GEN_ALL_CORE
, LMP : Last Tuesday, Periods are irregular.  No smoking  Alcohol: Socially  Occupation: Manager at [employer]  No other drug abuse

## 2022-07-20 NOTE — H&P ADULT - NSHPRISKPAPSMEAR_GEN_A_CORE
PT Evaluation     Today's date: 10/24/2018  Patient name: Naomie Barreto  : 1931  MRN: [id number]  Referring provider: Olinda White MD  Dx:   Encounter Diagnosis     ICD-10-CM    1  Acute right-sided low back pain without sciatica M54 5 Ambulatory referral to PT spine                  Assessment  Impairments: pain with function    Assessment details: Patient with significant R sacral/hip pain 8-10 weeks after initial fall  Would prefer to r/o bony disruption prior to starting care  Ambulatory referral to pain management initiated - will hold care until f/u  Understanding of Dx/Px/POC: good   Prognosis: fair    Goals  Short Term goals - 4 weeks  1  Patient will be independent HEP  2   Patient will report a 50% decrease in pain complaints  3   Increase strength 1/2 grade  4   Increase ROM 5-10 degrees  Long Term goals - 8 weeks  1  Patient will report elimination of pain complaints  2   Patient will return to all work related activities without restriction  3   Patient will return to all recreational activities without restriction  4   ROM WFL  5   Strength 5/5  Plan  Planned therapy interventions: manual therapy, strengthening and stretching  Frequency: 2x week  Duration in weeks: 4  Plan details: Start care if cleared by pain management  Subjective Evaluation    History of Present Illness  Mechanism of injury: Patient is a 79 y o  Female who suffered a fall in her garage on cement on 18  Patient was seen at [facility] and spent 2 days as a inpatient - no source of sx's was communicated to patient  Patient reports having multiple tests performed though I am unable to view them at this point  Current sx's:  R sacral/lumbar pain - severe  Sx's aggravated by ambulation, sitting, lying, and bending  MEDS:  None currently  Patient unable to sleep - trying to sleep upright on couch with R side unloaded  No previous history of LB issues      Quality of life: fair    Pain  Current pain ratin  At best pain ratin  At worst pain ratin  Quality: sharp and knife-like  Aggravating factors: standing and sitting  Progression: no change    Patient Goals  Patient goals for therapy: decreased pain, increased strength, independence with ADLs/IADLs and increased motion          Objective     Tenderness     Additional Tenderness Details  Tenderness R sacral region  Active Range of Motion     Lumbar   Flexion: 25 degrees with pain  Extension: 15 degrees with pain  Left lateral flexion: 20 degrees with pain  Right lateral flexion: 20 degrees with pain    Strength/Myotome Testing     Additional Strength Details  R hip strength testing did not increase sx's significantly  General Comments     Lumbar Comments  Unable to fully assess - patient unable to lie supine or prone  R hip ER in supine reproduced some sx's          Flowsheet Rows      Most Recent Value   PT/OT G-Codes   Current Score  44   Projected Score  62   Assessment Type  Evaluation   G code set  Mobility: Walking & Moving Around   Mobility: Walking and Moving Around Current Status ()  CL   Mobility: Walking and Moving Around Goal Status ()  CI          Precautions: Fall risk    Daily Treatment Diary     Manual                                                                                   Exercise Diary                                                                                                                                                                                                                                                                                      Modalities yes

## 2022-07-20 NOTE — CONSULT NOTE ADULT - ATTENDING COMMENTS
Intermediate likelihood for choledocholithiasis, acute cholecystitis.  We recommend a surgical evaluation for lap serge and an MRCP for eval for choledocholethiasis. Trend LFTs (currently not significantly abnormal). Imaging reviewed. Exam: RUQ tenderness. will follow Intermediate likelihood for choledocholithiasis, acute cholecystitis.  We recommend a surgical evaluation for lap serge and an MRCP for eval for choledocholithiases. Trend LFTs (currently mildly elevated). Imaging reviewed. Exam: RUQ tenderness. will follow

## 2022-07-20 NOTE — ED ADULT NURSE NOTE - NSIMPLEMENTINTERV_GEN_ALL_ED
Implemented All Universal Safety Interventions:  [place] to call system. Call bell, personal items and telephone within reach. Instruct patient to call for assistance. Room bathroom lighting operational. Non-slip footwear when patient is off stretcher. Physically safe environment: no spills, clutter or unnecessary equipment. Stretcher in lowest position, wheels locked, appropriate side rails in place.

## 2022-07-20 NOTE — H&P ADULT - HISTORY OF PRESENT ILLNESS
31 years old, Female with PMH of Migraine, PCOS, HLD and Obesity, present to the ED with sudden onset of RUQ abdominal pain, that started suddenly 3 days ago, episodic in nature, progressively worsening, radiating to the back and left shoulder. She describes the pain as sharp in nature but was dull initially, 9/10 in intensity but gets better with pain medications. It has no aggravating or relieving factors and is associated with Nausea and Nonbilious vomiting x 1.  She recently had a back pain which radiated down to the legs after weight lifting and was prescribed with muscle relaxant by the ED weeks ago. She is not taking any weight loss medications, and is on Norethindrone as contraceptive. She recently started using Tums for her acid reflux which she attributed it was due to pain.  In ED her vitals were:  BP: 127/59, HR 84, RR 18, Temp 97.6, O2 sat 100% RA

## 2022-07-20 NOTE — H&P ADULT - NSHPPHYSICALEXAM_GEN_ALL_CORE
GENERAL: NAD, lying in bed with pain in RUQ  HEAD:  Atraumatic, Normocephalic  EYES: conjunctiva and sclera clear  ENT: Moist mucous membranes  NECK: Supple, No JVD  CHEST/LUNG: Clear to auscultation bilaterally; No rales, rhonchi, wheezing, or rubs. Unlabored respirations  HEART: Regular rate and rhythm; No murmurs, rubs, or gallops  ABDOMEN: Tender in the RUQ, Mild positive Ortiz sign, Normal bowel sounds  EXTREMITIES:  No clubbing, cyanosis, or edema  NERVOUS SYSTEM:  A&Ox3

## 2022-07-20 NOTE — H&P ADULT - NSHPLABSRESULTS_GEN_ALL_CORE
14.2   17.95 )-----------( 353      ( 2022 23:45 )             40.4         141  |  103  |  13  ----------------------------<  122<H>  5.0   |  26  |  0.8    Ca    10.1      2022 23:45    TPro  7.9  /  Alb  4.6  /  TBili  0.4  /  DBili  <0.2  /  AST  119<H>  /  ALT  109<H>  /  AlkPhos  71            Urinalysis Basic - ( 2022 00:35 )    Color: Light Yellow / Appearance: Clear / S.015 / pH: x  Gluc: x / Ketone: Negative  / Bili: Negative / Urobili: <2 mg/dL   Blood: x / Protein: Negative / Nitrite: Negative   Leuk Esterase: Negative / RBC: 3 /HPF / WBC 1 /HPF   Sq Epi: x / Non Sq Epi: 2 /HPF / Bacteria: Negative        Lactate Trend   @ 23:45 Lactate:2.0         CAPILLARY BLOOD GLUCOSE

## 2022-07-20 NOTE — H&P ADULT - NSICDXFAMILYHX_GEN_ALL_CORE_FT
FAMILY HISTORY:  Father  Still living? Unknown  FH: coronary artery disease, Age at diagnosis: Age Unknown  FH: diabetes mellitus, Age at diagnosis: Age Unknown    Mother  Still living? Unknown  FH: diabetes mellitus, Age at diagnosis: Age Unknown

## 2022-07-20 NOTE — CONSULT NOTE ADULT - ASSESSMENT
31 years old, Female with PMH of Migraine, PCOS, HLD and Obesity, present to the ED with sudden onset of RUQ abdominal pain, that started suddenly 3 days ago, episodic in nature, progressively worsening, radiating to the back and left shoulder. She describes the pain as sharp in nature but was dull initially, 9/10 in intensity but gets better with pain medications. It has no aggravating or relieving factors and is associated with Nausea and Nonbilious vomiting x 1.      # RUQ abdominal pain--> acute cholecystitis vs biliary colic vs choledocholithiasis vs PUD  -U/S abd: 7/20/22: Cholelithiasis without definite evidence of cholecystitis  - afebrile  - Leukocytosis  - AST ALT >100  - Tbili and ALP WNL  - U/A, Lipase, Lactate unremarkable      Rec:  - Clear liquid diet  - PPI once a day  - Monitor LFts and CBC  - MRCP if choledocholithiasis will plan for ERCP  - surgery eval    - Please call GI [id number] or MS teams during weekdays till 5pm or call [phone] after 5 PM on weekdays and weekends  - Follow up with our [facility] located at [address] [place] [address]. Phone Number: [phone]

## 2022-07-20 NOTE — H&P ADULT - NSICDXPASTMEDICALHX_GEN_ALL_CORE_FT
PAST MEDICAL HISTORY:  Hyperlipidemia     Migraines     Obesity     PCOS (polycystic ovarian syndrome)

## 2022-07-20 NOTE — H&P ADULT - NSHPREVIEWOFSYSTEMS_GEN_ALL_CORE
She denies Fever/Chills, SOB, Chest pain, diarrhea, constipation. Her bowel and bladder activity is normal. She also denied itching of the body, change in color of the stool/urine. No yellowing of the eyes was noted during physical exam.

## 2022-07-20 NOTE — CONSULT NOTE ADULT - SUBJECTIVE AND OBJECTIVE BOX
Gastroenterology Consultation:    Patient is a 31y old  Female who presents with a chief complaint of Right upper quadrant abdominal pain (20 Jul 2022 09:13)      Admitted on: 07-20-22  HPI:  31 years old, Female with PMH of Migraine, PCOS, HLD and Obesity, present to the ED with sudden onset of RUQ abdominal pain, that started suddenly 3 days ago, episodic in nature, progressively worsening, radiating to the back and left shoulder. She describes the pain as sharp in nature but was dull initially, 9/10 in intensity but gets better with pain medications. It has no aggravating or relieving factors and is associated with Nausea and Nonbilious vomiting x 1.  She recently had a back pain which radiated down to the legs after weight lifting and was prescribed with muscle relaxant by the ED weeks ago. She is not taking any weight loss medications, and is on Norethindrone as contraceptive. She recently started using Tums for her acid reflux which she attributed it was due to pain.  In ED her vitals were:  BP: 127/59, HR 84, RR 18, Temp 97.6, O2 sat 100% RA       Prior records Reviewed (Y/N): Y  History obtained from person other than patient (Y/N): N    Prior EGD: never  Prior Colonoscopy: never      PAST MEDICAL & SURGICAL HISTORY:  PCOS (polycystic ovarian syndrome)      Obesity      Migraines      Hyperlipidemia      S/P appendectomy          FAMILY HISTORY:  FH: diabetes mellitus (Father, Mother)    FH: coronary artery disease (Father)        Social History:  Tobacco: Never  Alcohol: Never  Drugs: Never    Home Medications:  Aimovig:  (20 Jul 2022 09:38)  fenofibrate 48 mg oral tablet: 1 tab(s) orally once a day (20 Jul 2022 09:38)  Multiple Vitamins oral tablet: 1 tab(s) orally once a day (20 Jul 2022 09:38)  norethindrone 0.35 mg oral tablet: 1 tab(s) orally once a day (20 Jul 2022 09:38)  ZyrTEC 5 mg oral tablet: 1 tab(s) orally once a day (20 Jul 2022 09:38)    MEDICATIONS  (STANDING):    MEDICATIONS  (PRN):  ondansetron    Tablet 4 milliGRAM(s) Oral once PRN Nausea and/or Vomiting      Allergies  Allergy Status Unknown      Review of Systems:   Constitutional:  No Fever, No Chills  ENT/Mouth:  No Hearing Changes,  No Difficulty Swallowing  Eyes:  No Eye Pain, No Vision Changes  Cardiovascular:  No Chest Pain, No Palpitations  Respiratory:  No Cough, No Dyspnea  Gastrointestinal:  As described in HPI  Musculoskeletal:  No Joint Swelling, No Back Pain  Skin:  No Skin Lesions, No Jaundice  Neuro:  No Syncope, No Dizziness  Heme/Lymph:  No Bruising, No Bleeding.          Physical Examination:  T(C): 36.4 (07-19-22 @ 21:50), Max: 36.4 (07-19-22 @ 21:50)  HR: 81 (07-20-22 @ 07:16) (81 - 84)  BP: 122/71 (07-20-22 @ 07:16) (122/71 - 127/59)  RR: 18 (07-20-22 @ 07:16) (18 - 18)  SpO2: 99% (07-20-22 @ 07:16) (99% - 100%)    Weight (kg): 84.8 (07-19-22 @ 21:50)      Constitutional: No acute distress.  Eyes:. Conjunctivae are clear, Sclera is non-icteric.  Ears Nose and Throat: The external ears are normal appearing,  Oral mucosa is pink and moist.  Respiratory:  No signs of respiratory distress. Lung sounds are clear bilaterally.  Cardiovascular:  S1 S2, Regular rate and rhythm.  GI: Abdomen is soft, symmetric, and RUQ tenderness without distention. There are no visible lesions. Bowel sounds are present and normoactive in all four quadrants. No masses, hepatomegaly, or splenomegaly are noted.   Neuro: No Tremor, No involuntary movements  Skin: No rashes, No Jaundice.          Data: (reviewed by attending)                        14.2   17.95 )-----------( 353      ( 19 Jul 2022 23:45 )             40.4     Hgb Trend:  14.2  07-19-22 @ 23:45      07-19    141  |  103  |  13  ----------------------------<  122<H>  5.0   |  26  |  0.8    Ca    10.1      19 Jul 2022 23:45    TPro  7.9  /  Alb  4.6  /  TBili  0.4  /  DBili  <0.2  /  AST  119<H>  /  ALT  109<H>  /  AlkPhos  71  07-19    Liver panel trend:  TBili 0.4   /      /      /   AlkP 71   /   Tptn 7.9   /   Alb 4.6    /   DBili <0.2      07-19              Radiology:(reviewed by attending)    US Abdomen Upper Quadrant Right:   ACC: [id number] EXAM:  US ABDOMEN RT UPR QUADRANT                          PROCEDURE DATE:  07/20/2022          INTERPRETATION:  CLINICAL INFORMATION: Right upper quadrant pain    COMPARISON: CT dated 12/19/2020.    TECHNIQUE: Sonography of the rightupper quadrant.    FINDINGS:  Liver: Increased echogenicity.  Bile ducts: Common bile duct measures 8 mm.  Gallbladder: There is cholelithiasis and gallbladder sludge. No evidence   of gallbladder wall thickening. Negative sonographic Ortiz's sign.  Pancreas: Visualized portions are within normal limits.  Right kidney: 10.6 cm. No hydronephrosis.  Ascites: None.  IVC: Visualized portions are within normal limits.      IMPRESSION:      Cholelithiasis without definite evidence of cholecystitis. Ifclinically   warranted, nuclear medicine HIDA scan may be of benefit    Mildly dilated CBD. MRI/MRCP may be of benefit.            --- End of Report ---            CHINA FULTON MD; Attending Radiologist  This document has been electronically signed. Jul 20 2022  3:26AM (07-20-22 @ 03:02)

## 2022-07-20 NOTE — H&P ADULT - ATTENDING COMMENTS
as above    seen at bedside in er today    GI appreciated    MRCP possible choledocholithiasis; if + ERCP    ongoing monitoring, LFTs

## 2022-07-21 LAB
ANION GAP SERPL CALC-SCNC: 11 MMOL/L — SIGNIFICANT CHANGE UP (ref 7–14)
BASOPHILS # BLD AUTO: 0.02 K/UL — SIGNIFICANT CHANGE UP (ref 0–0.2)
BASOPHILS NFR BLD AUTO: 0.3 % — SIGNIFICANT CHANGE UP (ref 0–1)
BUN SERPL-MCNC: 8 MG/DL — LOW (ref 10–20)
CALCIUM SERPL-MCNC: 9.2 MG/DL — SIGNIFICANT CHANGE UP (ref 8.5–10.1)
CHLORIDE SERPL-SCNC: 103 MMOL/L — SIGNIFICANT CHANGE UP (ref 98–110)
CO2 SERPL-SCNC: 25 MMOL/L — SIGNIFICANT CHANGE UP (ref 17–32)
CREAT SERPL-MCNC: 0.6 MG/DL — LOW (ref 0.7–1.5)
CULTURE RESULTS: SIGNIFICANT CHANGE UP
EGFR: 122 ML/MIN/1.73M2 — SIGNIFICANT CHANGE UP
EOSINOPHIL # BLD AUTO: 0.21 K/UL — SIGNIFICANT CHANGE UP (ref 0–0.7)
EOSINOPHIL NFR BLD AUTO: 2.8 % — SIGNIFICANT CHANGE UP (ref 0–8)
GLUCOSE SERPL-MCNC: 77 MG/DL — SIGNIFICANT CHANGE UP (ref 70–99)
HCT VFR BLD CALC: 39 % — SIGNIFICANT CHANGE UP (ref 37–47)
HGB BLD-MCNC: 13.6 G/DL — SIGNIFICANT CHANGE UP (ref 12–16)
IMM GRANULOCYTES NFR BLD AUTO: 0.3 % — SIGNIFICANT CHANGE UP (ref 0.1–0.3)
LYMPHOCYTES # BLD AUTO: 2.59 K/UL — SIGNIFICANT CHANGE UP (ref 1.2–3.4)
LYMPHOCYTES # BLD AUTO: 33.9 % — SIGNIFICANT CHANGE UP (ref 20.5–51.1)
MCHC RBC-ENTMCNC: 32.2 PG — HIGH (ref 27–31)
MCHC RBC-ENTMCNC: 34.9 G/DL — SIGNIFICANT CHANGE UP (ref 32–37)
MCV RBC AUTO: 92.4 FL — SIGNIFICANT CHANGE UP (ref 81–99)
MONOCYTES # BLD AUTO: 0.42 K/UL — SIGNIFICANT CHANGE UP (ref 0.1–0.6)
MONOCYTES NFR BLD AUTO: 5.5 % — SIGNIFICANT CHANGE UP (ref 1.7–9.3)
NEUTROPHILS # BLD AUTO: 4.37 K/UL — SIGNIFICANT CHANGE UP (ref 1.4–6.5)
NEUTROPHILS NFR BLD AUTO: 57.2 % — SIGNIFICANT CHANGE UP (ref 42.2–75.2)
NRBC # BLD: 0 /100 WBCS — SIGNIFICANT CHANGE UP (ref 0–0)
PLATELET # BLD AUTO: 319 K/UL — SIGNIFICANT CHANGE UP (ref 130–400)
POTASSIUM SERPL-MCNC: 4 MMOL/L — SIGNIFICANT CHANGE UP (ref 3.5–5)
POTASSIUM SERPL-SCNC: 4 MMOL/L — SIGNIFICANT CHANGE UP (ref 3.5–5)
RBC # BLD: 4.22 M/UL — SIGNIFICANT CHANGE UP (ref 4.2–5.4)
RBC # FLD: 13 % — SIGNIFICANT CHANGE UP (ref 11.5–14.5)
SODIUM SERPL-SCNC: 139 MMOL/L — SIGNIFICANT CHANGE UP (ref 135–146)
SPECIMEN SOURCE: SIGNIFICANT CHANGE UP
WBC # BLD: 7.63 K/UL — SIGNIFICANT CHANGE UP (ref 4.8–10.8)
WBC # FLD AUTO: 7.63 K/UL — SIGNIFICANT CHANGE UP (ref 4.8–10.8)

## 2022-07-21 PROCEDURE — 99233 SBSQ HOSP IP/OBS HIGH 50: CPT

## 2022-07-21 PROCEDURE — 99253 IP/OBS CNSLTJ NEW/EST LOW 45: CPT

## 2022-07-21 PROCEDURE — 71045 X-RAY EXAM CHEST 1 VIEW: CPT | Mod: 26

## 2022-07-21 RX ORDER — SODIUM CHLORIDE 9 MG/ML
1000 INJECTION, SOLUTION INTRAVENOUS
Refills: 0 | Status: DISCONTINUED | OUTPATIENT
Start: 2022-07-21 | End: 2022-07-23

## 2022-07-21 RX ORDER — METRONIDAZOLE 500 MG
TABLET ORAL
Refills: 0 | Status: DISCONTINUED | OUTPATIENT
Start: 2022-07-21 | End: 2022-07-23

## 2022-07-21 RX ORDER — FENOFIBRATE,MICRONIZED 130 MG
48 CAPSULE ORAL DAILY
Refills: 0 | Status: DISCONTINUED | OUTPATIENT
Start: 2022-07-21 | End: 2022-07-21

## 2022-07-21 RX ORDER — CIPROFLOXACIN LACTATE 400MG/40ML
400 VIAL (ML) INTRAVENOUS EVERY 12 HOURS
Refills: 0 | Status: DISCONTINUED | OUTPATIENT
Start: 2022-07-21 | End: 2022-07-22

## 2022-07-21 RX ORDER — METRONIDAZOLE 500 MG
500 TABLET ORAL ONCE
Refills: 0 | Status: COMPLETED | OUTPATIENT
Start: 2022-07-21 | End: 2022-07-21

## 2022-07-21 RX ORDER — NORETHINDRONE 0.35 MG/1
1 TABLET ORAL
Qty: 0 | Refills: 0 | DISCHARGE

## 2022-07-21 RX ORDER — METRONIDAZOLE 500 MG
500 TABLET ORAL EVERY 8 HOURS
Refills: 0 | Status: DISCONTINUED | OUTPATIENT
Start: 2022-07-21 | End: 2022-07-23

## 2022-07-21 RX ORDER — DIPHENHYDRAMINE HCL 50 MG
25 CAPSULE ORAL ONCE
Refills: 0 | Status: COMPLETED | OUTPATIENT
Start: 2022-07-21 | End: 2022-07-21

## 2022-07-21 RX ORDER — CETIRIZINE HYDROCHLORIDE 10 MG/1
1 TABLET ORAL
Qty: 0 | Refills: 0 | DISCHARGE

## 2022-07-21 RX ORDER — MORPHINE SULFATE 50 MG/1
2 CAPSULE, EXTENDED RELEASE ORAL EVERY 4 HOURS
Refills: 0 | Status: DISCONTINUED | OUTPATIENT
Start: 2022-07-21 | End: 2022-07-23

## 2022-07-21 RX ORDER — METOCLOPRAMIDE HCL 10 MG
10 TABLET ORAL THREE TIMES A DAY
Refills: 0 | Status: DISCONTINUED | OUTPATIENT
Start: 2022-07-21 | End: 2022-07-21

## 2022-07-21 RX ADMIN — PANTOPRAZOLE SODIUM 40 MILLIGRAM(S): 20 TABLET, DELAYED RELEASE ORAL at 06:25

## 2022-07-21 RX ADMIN — Medication 25 MILLIGRAM(S): at 18:53

## 2022-07-21 RX ADMIN — SODIUM CHLORIDE 100 MILLILITER(S): 9 INJECTION, SOLUTION INTRAVENOUS at 10:15

## 2022-07-21 RX ADMIN — Medication 100 MILLIGRAM(S): at 13:44

## 2022-07-21 RX ADMIN — Medication 100 MILLIGRAM(S): at 21:22

## 2022-07-21 RX ADMIN — SODIUM CHLORIDE 100 MILLILITER(S): 9 INJECTION, SOLUTION INTRAVENOUS at 21:22

## 2022-07-21 RX ADMIN — Medication 200 MILLIGRAM(S): at 18:10

## 2022-07-21 RX ADMIN — Medication 100 MILLIGRAM(S): at 10:14

## 2022-07-21 NOTE — CONSULT NOTE ADULT - ASSESSMENT
ASSESSMENT:  31yF w/ PMHx of  PCOS, presents with clinical and radiographic findings concerning for choledocholithiasis, currently pending MRCP.    PLAN:  - MRCP, FU results and GI plan for possible ERCP  - Trend LFT, TBili, Direct TBili  - Surgery team to follow  - WIll discuss with attending   ASSESSMENT:  31yF w/ PMHx of  PCOS, presents with clinical and radiographic findings concerning for choledocholithiasis, currently pending MRCP.    PLAN:  - MRCP, FU results and GI plan for possible ERCP  - Trend LFT, TBili, Direct TBili  - Consider workup for hepatitis (AST/ALT >600)  - Surgery team to follow  - WIll discuss with attending   ASSESSMENT:  31yF w/ PMHx of  PCOS, presents with clinical and radiographic findings concerning for choledocholithiasis, currently pending MRCP.    PLAN:  - MRCP, FU results and GI plan for possible ERCP  - Trend LFT, TBili, Direct TBili  - Recommend complete hepatitis panel (AST/ALT >600)  - Consider medication induced LFT elevation (OCPs)  - Surgery team to follow  - Discussed with Attending

## 2022-07-21 NOTE — CONSULT NOTE ADULT - ATTENDING COMMENTS
ACS Attending Note Attestation    Patient is examined and evaluated at the bedside with the residents/PAs. Treatment plan discussed with the team, nurses, and consulting physicians and consulting teams. Medications, radiological studies and all other relevant studies reviewed. I reviewed the resident/PA note and agreed with above assessment and plan with following additions and corrections.    YOANNA MCKEON Patient is a 31y old  Female who presents with a chief complaint of Right upper quadrant abdominal pain with elevated LFTs    Allergies  Allergy Status Unknown  Intolerances    PAST MEDICAL & SURGICAL HISTORY:  PCOS (polycystic ovarian syndrome)  Obesity  Migraines  Hyperlipidemia  S/P appendectomy        Vital Signs Last 24 Hrs  T(C): 36 (21 Jul 2022 20:51), Max: 36.6 (21 Jul 2022 12:20)  T(F): 96.8 (21 Jul 2022 20:51), Max: 97.9 (21 Jul 2022 12:20)  HR: 61 (21 Jul 2022 20:51) (59 - 69)  BP: 107/54 (21 Jul 2022 20:51) (105/69 - 124/70)  BP(mean): --  RR: 19 (21 Jul 2022 20:51) (18 - 19)  SpO2: 100% (21 Jul 2022 20:51) (98% - 100%)    Parameters below as of 21 Jul 2022 20:51  Patient On (Oxygen Delivery Method): room air                            13.6   7.63  )-----------( 319      ( 21 Jul 2022 09:17 )             39.0     07-21    139  |  103  |  8<L>  ----------------------------<  77  4.0   |  25  |  0.6<L>    Ca    9.2      21 Jul 2022 09:17    TPro  7.4  /  Alb  4.1  /  TBili  1.6<H>  /  DBili  x   /  AST  672<H>  /  ALT  677<H>  /  AlkPhos  116<H>  07-20      Diagnosis: elevated LFTs    Plan:	  - GI consult  - MRCP  - supportive care  - GI/DVT prophylaxis  - pain management  - follow up consults  - repeat studies as needed    Hui Gimenez MD, FACS  Trauma/ACS/Surcical Critical care Attending

## 2022-07-21 NOTE — PROGRESS NOTE ADULT - SUBJECTIVE AND OBJECTIVE BOX
24H events:    Patient is a 31y old Female who presents with a chief complaint of Right upper quadrant abdominal pain (2022 10:48)    Primary diagnosis of Abdominal pain       Today is hospital day 1d.  no acute events overnight.     PAST MEDICAL & SURGICAL HISTORY  PCOS (polycystic ovarian syndrome)    Obesity    Migraines    Hyperlipidemia    S/P appendectomy      SOCIAL HISTORY:  Negative for smoking/alcohol/drug use.     ALLERGIES:  Allergy Status Unknown    MEDICATIONS:  STANDING MEDICATIONS  ciprofloxacin   IVPB 400 milliGRAM(s) IV Intermittent every 12 hours  lactated ringers. 1000 milliLiter(s) IV Continuous <Continuous>  metroNIDAZOLE  IVPB      metroNIDAZOLE  IVPB 500 milliGRAM(s) IV Intermittent every 8 hours  pantoprazole    Tablet 40 milliGRAM(s) Oral before breakfast    PRN MEDICATIONS  morphine  - Injectable 2 milliGRAM(s) IV Push every 4 hours PRN    VITALS:   T(F): 97.9  HR: 69  BP: 124/70  RR: 18  SpO2: 98%    LABS:                        13.6   7.63  )-----------( 319      ( 2022 09:17 )             39.0     07-21    139  |  103  |  8<L>  ----------------------------<  77  4.0   |  25  |  0.6<L>    Ca    9.2      2022 09:17    TPro  7.4  /  Alb  4.1  /  TBili  1.6<H>  /  DBili  x   /  AST  672<H>  /  ALT  677<H>  /  AlkPhos  116<H>  07-20    PT/INR - ( 2022 17:50 )   PT: 11.70 sec;   INR: 1.02 ratio         PTT - ( 2022 17:50 )  PTT:31.5 sec  Urinalysis Basic - ( 2022 00:35 )    Color: Light Yellow / Appearance: Clear / S.015 / pH: x  Gluc: x / Ketone: Negative  / Bili: Negative / Urobili: <2 mg/dL   Blood: x / Protein: Negative / Nitrite: Negative   Leuk Esterase: Negative / RBC: 3 /HPF / WBC 1 /HPF   Sq Epi: x / Non Sq Epi: 2 /HPF / Bacteria: Negative            Culture - Urine (collected 2022 00:35)  Source: Clean Catch Clean Catch (Midstream)  Final Report (2022 07:46):    <10,000 CFU/mL Normal Urogenital Kristine          RADIOLOGY:    PHYSICAL EXAM:  GENERAL: NAD  EYES: conjunctiva and sclera clear  ENMT: Moist mucous membranes  NERVOUS SYSTEM:  Alert & Oriented X3, Good concentration  CHEST/LUNG: Clear to auscultation bilaterally  HEART: Regular rate and rhythm  ABDOMEN: Soft, Nontender, Nondistended  EXTREMITIES:  no edema

## 2022-07-21 NOTE — CONSULT NOTE ADULT - SUBJECTIVE AND OBJECTIVE BOX
GENERAL SURGERY CONSULT NOTE    Patient: YOANNA MCKEON , 31y (88)Female   MRN: [id number]  Location: [address] [facility]  Visit: 22 Inpatient  Date: 22 @ 10:50    HPI:  "31 years old, Female with PMH of Migraine, PCOS, HLD and Obesity, present to the ED with sudden onset of RUQ abdominal pain, that started suddenly 3 days ago, episodic in nature, progressively worsening, radiating to the back and left shoulder. She describes the pain as sharp in nature but was dull initially, 9/10 in intensity but gets better with pain medications. It has no aggravating or relieving factors and is associated with Nausea and Nonbilious vomiting x 1.  She recently had a back pain which radiated down to the legs after weight lifting and was prescribed with muscle relaxant by the ED weeks ago. She is not taking any weight loss medications, and is on Norethindrone as contraceptive. She recently started using Tums for her acid reflux which she attributed it was due to pain.  In ED her vitals were:  BP: 127/59, HR 84, RR 18, Temp 97.6, O2 sat 100% RA (2022 09:13)"    Reason for surgical consult: Suspected choledocholithiasis, eval for laparoscopic cholecystectomy       PAST MEDICAL & SURGICAL HISTORY:  PCOS (polycystic ovarian syndrome)      Obesity      Migraines      Hyperlipidemia      S/P appendectomy          Home Medications:  Aimovig:  (2022 09:38)  fenofibrate 48 mg oral tablet: 1 tab(s) orally once a day (2022 09:38)  Multiple Vitamins oral tablet: 1 tab(s) orally once a day (2022 09:38)  norethindrone 0.35 mg oral tablet: 1 tab(s) orally once a day (2022 08:09)        VITALS:  T(F): 97.6 (22 @ 05:47), Max: 97.6 (22 @ 05:47)  HR: 59 (22 @ 05:47) (59 - 73)  BP: 105/69 (22 @ 05:47) (105/69 - 118/67)  RR: 18 (22 @ 05:47) (18 - 18)  SpO2: 98% (22 @ 05:47) (97% - 99%)    PHYSICAL EXAM: *** Incomplete  General: NAD, AAOx3, calm and cooperative  HEENT: NCAT, SHAHRIAR, EOMI, Trachea ML, Neck supple  Cardiac: RRR S1, S2, no Murmurs, rubs or gallops  Respiratory: CTAB, normal respiratory effort, breath sounds equal BL, no wheeze, rhonchi or crackles  Abdomen: Soft, non-distended, non-tender, no rebound, no guarding. +BS.  Musculoskeletal: Strength 5/5 BL UE/LE, ROM intact, compartments soft  Neuro: Sensation grossly intact and equal throughout, no focal deficits  Vascular: Pulses 2+ throughout, extremities well perfused  Skin: Warm/dry, normal color, no jaundice    MEDICATIONS  (STANDING):  ciprofloxacin   IVPB 400 milliGRAM(s) IV Intermittent every 12 hours  lactated ringers. 1000 milliLiter(s) (100 mL/Hr) IV Continuous <Continuous>  metroNIDAZOLE  IVPB      metroNIDAZOLE  IVPB 500 milliGRAM(s) IV Intermittent every 8 hours  pantoprazole    Tablet 40 milliGRAM(s) Oral before breakfast    MEDICATIONS  (PRN):  morphine  - Injectable 2 milliGRAM(s) IV Push every 4 hours PRN Moderate Pain (4 - 6)      LAB/STUDIES:                        13.6   7.63  )-----------( 319      ( 2022 09:17 )             39.0     07-    139  |  103  |  8<L>  ----------------------------<  77  4.0   |  25  |  0.6<L>    Ca    9.2      2022 09:17    TPro  7.4  /  Alb  4.1  /  TBili  1.6<H>  /  DBili  x   /  AST  672<H>  /  ALT  677<H>  /  AlkPhos  116<H>  -20    PT/INR - ( 2022 17:50 )   PT: 11.70 sec;   INR: 1.02 ratio         PTT - ( 2022 17:50 )  PTT:31.5 sec  LIVER FUNCTIONS - ( 2022 17:50 )  Alb: 4.1 g/dL / Pro: 7.4 g/dL / ALK PHOS: 116 U/L / ALT: 677 U/L / AST: 672 U/L / GGT: x           Urinalysis Basic - ( 2022 00:35 )    Color: Light Yellow / Appearance: Clear / S.015 / pH: x  Gluc: x / Ketone: Negative  / Bili: Negative / Urobili: <2 mg/dL   Blood: x / Protein: Negative / Nitrite: Negative   Leuk Esterase: Negative / RBC: 3 /HPF / WBC 1 /HPF   Sq Epi: x / Non Sq Epi: 2 /HPF / Bacteria: Negative                  Culture - Urine (collected 2022 00:35)  Source: Clean Catch Clean Catch (Midstream)  Final Report (2022 07:46):    <10,000 CFU/mL Normal Urogenital Kristine      IMAGING:  MRCP Pending     GENERAL SURGERY CONSULT NOTE    Patient: YOANNA MCKEON , 31y (88)Female   MRN: [id number]  Location: [address] [facility]  Visit: 22 Inpatient  Date: 22 @ 10:50    HPI:  "31 years old, Female with PMH of Migraine, PCOS, HLD and Obesity, present to the ED with sudden onset of RUQ abdominal pain, that started suddenly 3 days ago, episodic in nature, progressively worsening, radiating to the back and left shoulder. She describes the pain as sharp in nature but was dull initially, 9/10 in intensity but gets better with pain medications. It has no aggravating or relieving factors and is associated with Nausea and Nonbilious vomiting x 1.  She recently had a back pain which radiated down to the legs after weight lifting and was prescribed with muscle relaxant by the ED weeks ago. She is not taking any weight loss medications, and is on Norethindrone as contraceptive. She recently started using Tums for her acid reflux which she attributed it was due to pain.  In ED her vitals were:  BP: 127/59, HR 84, RR 18, Temp 97.6, O2 sat 100% RA (2022 09:13)"    Reason for surgical consult: Suspected choledocholithiasis, eval for laparoscopic cholecystectomy       PAST MEDICAL & SURGICAL HISTORY:  PCOS (polycystic ovarian syndrome)      Obesity      Migraines      Hyperlipidemia      S/P appendectomy          Home Medications:  Aimovig:  (2022 09:38)  fenofibrate 48 mg oral tablet: 1 tab(s) orally once a day (2022 09:38)  Multiple Vitamins oral tablet: 1 tab(s) orally once a day (2022 09:38)  norethindrone 0.35 mg oral tablet: 1 tab(s) orally once a day (2022 08:09)        VITALS:  T(F): 97.6 (22 @ 05:47), Max: 97.6 (22 @ 05:47)  HR: 59 (22 @ 05:47) (59 - 73)  BP: 105/69 (22 @ 05:47) (105/69 - 118/67)  RR: 18 (22 @ 05:47) (18 - 18)  SpO2: 98% (- @ 05:47) (97% - 99%)    PHYSICAL EXAM:  General: NAD, AAOx3, calm and cooperative  Cardiac: RR  Respiratory: Unlabored breathing at rest  Abdomen: Soft, non-distended, tender to deep palpation in RUQ, no guarding or rebound.  Musculoskeletal: Strength 5/5 BL UE/LE, ROM intact, compartments soft  Neuro: Sensation grossly intact and equal throughout, no focal deficits  Skin: Warm/dry, normal color, no jaundice    MEDICATIONS  (STANDING):  ciprofloxacin   IVPB 400 milliGRAM(s) IV Intermittent every 12 hours  lactated ringers. 1000 milliLiter(s) (100 mL/Hr) IV Continuous <Continuous>  metroNIDAZOLE  IVPB      metroNIDAZOLE  IVPB 500 milliGRAM(s) IV Intermittent every 8 hours  pantoprazole    Tablet 40 milliGRAM(s) Oral before breakfast    MEDICATIONS  (PRN):  morphine  - Injectable 2 milliGRAM(s) IV Push every 4 hours PRN Moderate Pain (4 - 6)      LAB/STUDIES:                        13.6   7.63  )-----------( 319      ( 2022 09:17 )             39.0     07-    139  |  103  |  8<L>  ----------------------------<  77  4.0   |  25  |  0.6<L>    Ca    9.2      2022 09:17    TPro  7.4  /  Alb  4.1  /  TBili  1.6<H>  /  DBili  x   /  AST  672<H>  /  ALT  677<H>  /  AlkPhos  116<H>  07-20    PT/INR - ( 2022 17:50 )   PT: 11.70 sec;   INR: 1.02 ratio         PTT - ( 2022 17:50 )  PTT:31.5 sec  LIVER FUNCTIONS - ( 2022 17:50 )  Alb: 4.1 g/dL / Pro: 7.4 g/dL / ALK PHOS: 116 U/L / ALT: 677 U/L / AST: 672 U/L / GGT: x           Urinalysis Basic - ( 2022 00:35 )    Color: Light Yellow / Appearance: Clear / S.015 / pH: x  Gluc: x / Ketone: Negative  / Bili: Negative / Urobili: <2 mg/dL   Blood: x / Protein: Negative / Nitrite: Negative   Leuk Esterase: Negative / RBC: 3 /HPF / WBC 1 /HPF   Sq Epi: x / Non Sq Epi: 2 /HPF / Bacteria: Negative                  Culture - Urine (collected 2022 00:35)  Source: Clean Catch Clean Catch (Midstream)  Final Report (2022 07:46):    <10,000 CFU/mL Normal Urogenital Kristine      IMAGING:  MRCP Pending

## 2022-07-22 LAB
ALBUMIN SERPL ELPH-MCNC: 4.2 G/DL — SIGNIFICANT CHANGE UP (ref 3.5–5.2)
ALP SERPL-CCNC: 102 U/L — SIGNIFICANT CHANGE UP (ref 30–115)
ALT FLD-CCNC: 358 U/L — HIGH (ref 0–41)
ANION GAP SERPL CALC-SCNC: 11 MMOL/L — SIGNIFICANT CHANGE UP (ref 7–14)
AST SERPL-CCNC: 110 U/L — HIGH (ref 0–41)
BASOPHILS # BLD AUTO: 0.03 K/UL — SIGNIFICANT CHANGE UP (ref 0–0.2)
BASOPHILS NFR BLD AUTO: 0.4 % — SIGNIFICANT CHANGE UP (ref 0–1)
BILIRUB DIRECT SERPL-MCNC: 0.2 MG/DL — SIGNIFICANT CHANGE UP (ref 0–0.3)
BILIRUB SERPL-MCNC: 0.6 MG/DL — SIGNIFICANT CHANGE UP (ref 0.2–1.2)
BUN SERPL-MCNC: 6 MG/DL — LOW (ref 10–20)
CALCIUM SERPL-MCNC: 9.5 MG/DL — SIGNIFICANT CHANGE UP (ref 8.5–10.1)
CHLORIDE SERPL-SCNC: 101 MMOL/L — SIGNIFICANT CHANGE UP (ref 98–110)
CO2 SERPL-SCNC: 27 MMOL/L — SIGNIFICANT CHANGE UP (ref 17–32)
CREAT SERPL-MCNC: 0.7 MG/DL — SIGNIFICANT CHANGE UP (ref 0.7–1.5)
EGFR: 118 ML/MIN/1.73M2 — SIGNIFICANT CHANGE UP
EOSINOPHIL # BLD AUTO: 0.16 K/UL — SIGNIFICANT CHANGE UP (ref 0–0.7)
EOSINOPHIL NFR BLD AUTO: 2 % — SIGNIFICANT CHANGE UP (ref 0–8)
GLUCOSE SERPL-MCNC: 85 MG/DL — SIGNIFICANT CHANGE UP (ref 70–99)
HCT VFR BLD CALC: 40.3 % — SIGNIFICANT CHANGE UP (ref 37–47)
HGB BLD-MCNC: 14 G/DL — SIGNIFICANT CHANGE UP (ref 12–16)
IMM GRANULOCYTES NFR BLD AUTO: 0.4 % — HIGH (ref 0.1–0.3)
LYMPHOCYTES # BLD AUTO: 2.12 K/UL — SIGNIFICANT CHANGE UP (ref 1.2–3.4)
LYMPHOCYTES # BLD AUTO: 26.1 % — SIGNIFICANT CHANGE UP (ref 20.5–51.1)
MAGNESIUM SERPL-MCNC: 1.8 MG/DL — SIGNIFICANT CHANGE UP (ref 1.8–2.4)
MCHC RBC-ENTMCNC: 31.7 PG — HIGH (ref 27–31)
MCHC RBC-ENTMCNC: 34.7 G/DL — SIGNIFICANT CHANGE UP (ref 32–37)
MCV RBC AUTO: 91.4 FL — SIGNIFICANT CHANGE UP (ref 81–99)
MONOCYTES # BLD AUTO: 0.65 K/UL — HIGH (ref 0.1–0.6)
MONOCYTES NFR BLD AUTO: 8 % — SIGNIFICANT CHANGE UP (ref 1.7–9.3)
NEUTROPHILS # BLD AUTO: 5.14 K/UL — SIGNIFICANT CHANGE UP (ref 1.4–6.5)
NEUTROPHILS NFR BLD AUTO: 63.1 % — SIGNIFICANT CHANGE UP (ref 42.2–75.2)
NRBC # BLD: 0 /100 WBCS — SIGNIFICANT CHANGE UP (ref 0–0)
PLATELET # BLD AUTO: 322 K/UL — SIGNIFICANT CHANGE UP (ref 130–400)
POTASSIUM SERPL-MCNC: 3.9 MMOL/L — SIGNIFICANT CHANGE UP (ref 3.5–5)
POTASSIUM SERPL-SCNC: 3.9 MMOL/L — SIGNIFICANT CHANGE UP (ref 3.5–5)
PROT SERPL-MCNC: 6.9 G/DL — SIGNIFICANT CHANGE UP (ref 6–8)
RBC # BLD: 4.41 M/UL — SIGNIFICANT CHANGE UP (ref 4.2–5.4)
RBC # FLD: 12.6 % — SIGNIFICANT CHANGE UP (ref 11.5–14.5)
SODIUM SERPL-SCNC: 139 MMOL/L — SIGNIFICANT CHANGE UP (ref 135–146)
WBC # BLD: 8.13 K/UL — SIGNIFICANT CHANGE UP (ref 4.8–10.8)
WBC # FLD AUTO: 8.13 K/UL — SIGNIFICANT CHANGE UP (ref 4.8–10.8)

## 2022-07-22 PROCEDURE — 99233 SBSQ HOSP IP/OBS HIGH 50: CPT

## 2022-07-22 RX ORDER — CEFTRIAXONE 500 MG/1
1000 INJECTION, POWDER, FOR SOLUTION INTRAMUSCULAR; INTRAVENOUS EVERY 24 HOURS
Refills: 0 | Status: DISCONTINUED | OUTPATIENT
Start: 2022-07-22 | End: 2022-07-23

## 2022-07-22 RX ADMIN — SODIUM CHLORIDE 100 MILLILITER(S): 9 INJECTION, SOLUTION INTRAVENOUS at 20:42

## 2022-07-22 RX ADMIN — CEFTRIAXONE 100 MILLIGRAM(S): 500 INJECTION, POWDER, FOR SOLUTION INTRAMUSCULAR; INTRAVENOUS at 15:53

## 2022-07-22 RX ADMIN — PANTOPRAZOLE SODIUM 40 MILLIGRAM(S): 20 TABLET, DELAYED RELEASE ORAL at 05:28

## 2022-07-22 RX ADMIN — Medication 100 MILLIGRAM(S): at 05:29

## 2022-07-22 RX ADMIN — Medication 100 MILLIGRAM(S): at 13:40

## 2022-07-22 RX ADMIN — Medication 100 MILLIGRAM(S): at 21:08

## 2022-07-22 NOTE — PROGRESS NOTE ADULT - SUBJECTIVE AND OBJECTIVE BOX
Patient is a 31 years old Female with PMHx of Migraine, PCOS, HLD and Obesity, who presented to the ED with sudden onset of RUQ abdominal pain, that started suddenly, episodic in nature, progressively worsening, radiating to the back and left shoulder. She describes the pain as sharp in nature but was dull initially, 9/10 in intensity but gets better with pain medications. It has no aggravating or relieving factors and is associated with Nausea and Nonbilious vomiting x 1. She is admitted and still awaiting MRCP. Pain controlled. No overnight events.     PAST MEDICAL & SURGICAL HISTORY:  PCOS (polycystic ovarian syndrome)  Obesity  Migraines  Hyperlipidemia  S/P appendectomy      Home Medications:  Aimovig:  (20 Jul 2022 09:38)  fenofibrate 48 mg oral tablet: 1 tab(s) orally once a day (20 Jul 2022 09:38)  Multiple Vitamins oral tablet: 1 tab(s) orally once a day (20 Jul 2022 09:38)  norethindrone 0.35 mg oral tablet: 1 tab(s) orally once a day (20 Jul 2022 09:38)  ZyrTEC 5 mg oral tablet: 1 tab(s) orally once a day (20 Jul 2022 09:38)    MEDICATIONS  (STANDING):    MEDICATIONS  (PRN):  ondansetron    Tablet 4 milliGRAM(s) Oral once PRN Nausea and/or Vomiting      Allergies  Allergy Status Unknown      Review of Systems:   Constitutional:  No Fever, No Chills  ENT/Mouth:  No Hearing Changes,  No Difficulty Swallowing  Eyes:  No Eye Pain, No Vision Changes  Cardiovascular:  No Chest Pain, No Palpitations  Respiratory:  No Cough, No Dyspnea  Gastrointestinal:  As described in HPI  Musculoskeletal:  No Joint Swelling, No Back Pain  Skin:  No Skin Lesions, No Jaundice  Neuro:  No Syncope, No Dizziness  Heme/Lymph:  No Bruising, No Bleeding.      Vital Signs Last 24 Hrs  T(C): 36.3 (22 Jul 2022 05:37), Max: 36.6 (21 Jul 2022 12:20)  T(F): 97.3 (22 Jul 2022 05:37), Max: 97.9 (21 Jul 2022 12:20)  HR: 71 (22 Jul 2022 05:37) (61 - 71)  BP: 107/66 (22 Jul 2022 05:37) (107/54 - 124/70)  BP(mean): --  RR: 18 (22 Jul 2022 05:37) (18 - 19)  SpO2: 100% (22 Jul 2022 05:37) (100% - 100%)    Parameters below as of 21 Jul 2022 20:51  Patient On (Oxygen Delivery Method): room air      Constitutional: No acute distress.  Eyes:. Conjunctivae are clear, Sclera is non-icteric.  Ears Nose and Throat: The external ears are normal appearing,  Oral mucosa is pink and moist.  Respiratory:  No signs of respiratory distress. Lung sounds are clear bilaterally.  Cardiovascular:  S1 S2, Regular rate and rhythm.  GI: Abdomen is soft, symmetric, and RUQ tenderness without distention. There are no visible lesions. Bowel sounds are present and normoactive in all four quadrants. No masses, hepatomegaly, or splenomegaly are noted.   Neuro: No Tremor, No involuntary movements  Skin: No rashes, No Jaundice.          Labs:                        14.0   8.13  )-----------( 322      ( 22 Jul 2022 07:05 )             40.3       07-22    139  |  101  |  6<L>  ----------------------------<  85  3.9   |  27  |  0.7    Ca    9.5      22 Jul 2022 07:05  Mg     1.8     07-22    TPro  6.9  /  Alb  4.2  /  TBili  0.6  /  DBili  0.2  /  AST  110<H>  /  ALT  358<H>  /  AlkPhos  102  07-22

## 2022-07-22 NOTE — PROGRESS NOTE ADULT - ATTENDING COMMENTS
31F with RUQ Abdominal Pain w/ Gallstones and transaminitis   - suspected choledocholithiasis and possible stone has passed (Tbili low and LFTs trending down wbc trending down)  - sepsis poa   - Ceftriaxone 1g IV qd  - Flagyl 500mg IV q8h  - d/c IVF  - Liquid diet and if okay with GI advance to low fat diet   - Blood cxs NTD  - Awaiting MRCP and possible ERCP / Cholecystectomy   - f/u Hep panel     Hold antilipids meds     Pending: MRCP ?ERCP
31F from Home with h/x Hyperlipedemia and Migrane HA who is here for RUQ Abdominal Pain associated with Nausea and found to have Obstructive Transaminitis 2/2 Suspected Choledocholithiasis as RUQ Sono shows Cholelithiasis without evidence of Cholecystitis and leukocytosis 18 s/p IV Abx in ER.     Dx: Transaminitis suspected choledocholithiasis with Gall stones evidence without acute cholecystitis    Plan: IVF, Clear Liquid diet, Morphine 2mg IV q4h/prn, Surgery eval, MRCP and possible ERCP - GI Eval appreciated.   - Hold cholesterol meds as transaminitis   - Hold migranes med (once a month injection)  - If has HA pls give Morphine and NSAIDS     GI/DVT Proph    Pending: MRCP / GI f/u / Sx Cons     Dispo: Acute - Home
ACS Attending Note Attestation    Patient is examined and evaluated at the bedside with the residents/PAs. Treatment plan discussed with the team, nurses, and consulting physicians and consulting teams. Medications, radiological studies and all other relevant studies reviewed. I reviewed the resident/PA note and agreed with above assessment and plan with following additions and corrections.    YOANNA MCKEON Patient is a 31y old  Female who presents with a chief complaint of Right upper quadrant abdominal pain     Allergies  Cipro I.V. (Eye Irritation; Rash; Muscle Pain)  Intolerances    PAST MEDICAL & SURGICAL HISTORY:  PCOS (polycystic ovarian syndrome)  Obesity  Migraines  Hyperlipidemia  S/P appendectomy      Diagnosis:  Dilated CBD                   Cholelithiasis     Plan:	  - NPo with plan for surgery over the weekend   - supportive care  - GI/DVT prophylaxis  - pain management  - follow up consults  - repeat studies as needed    Hui Gimenez MD, FACS  Trauma/ACS/Surcical Critical care Attending

## 2022-07-22 NOTE — PROGRESS NOTE ADULT - SUBJECTIVE AND OBJECTIVE BOX
24H events:    Patient is a 31y old Female who presents with a chief complaint of Right upper quadrant abdominal pain (22 Jul 2022 08:27)    Primary diagnosis of Abdominal pain       Today is hospital day 2d.  she developed acute rash and itching from ciprofloxacin.     PAST MEDICAL & SURGICAL HISTORY  PCOS (polycystic ovarian syndrome)    Obesity    Migraines    Hyperlipidemia    S/P appendectomy      SOCIAL HISTORY:  Negative for smoking/alcohol/drug use.     ALLERGIES:  Allergy Status Unknown    MEDICATIONS:  STANDING MEDICATIONS  cefTRIAXone   IVPB 1000 milliGRAM(s) IV Intermittent every 24 hours  lactated ringers. 1000 milliLiter(s) IV Continuous <Continuous>  metroNIDAZOLE  IVPB      metroNIDAZOLE  IVPB 500 milliGRAM(s) IV Intermittent every 8 hours  pantoprazole    Tablet 40 milliGRAM(s) Oral before breakfast    PRN MEDICATIONS  morphine  - Injectable 2 milliGRAM(s) IV Push every 4 hours PRN    VITALS:   T(F): 97.8  HR: 76  BP: 130/76  RR: 20  SpO2: 100%    LABS:                        14.0   8.13  )-----------( 322      ( 22 Jul 2022 07:05 )             40.3     07-22    139  |  101  |  6<L>  ----------------------------<  85  3.9   |  27  |  0.7    Ca    9.5      22 Jul 2022 07:05  Mg     1.8     07-22    TPro  6.9  /  Alb  4.2  /  TBili  0.6  /  DBili  0.2  /  AST  110<H>  /  ALT  358<H>  /  AlkPhos  102  07-22    PT/INR - ( 20 Jul 2022 17:50 )   PT: 11.70 sec;   INR: 1.02 ratio         PTT - ( 20 Jul 2022 17:50 )  PTT:31.5 sec          Culture - Urine (collected 20 Jul 2022 00:35)  Source: Clean Catch Clean Catch (Midstream)  Final Report (21 Jul 2022 07:46):    <10,000 CFU/mL Normal Urogenital Kristine          RADIOLOGY:    PHYSICAL EXAM:  GENERAL: NAD  EYES: conjunctiva and sclera clear  ENMT: Moist mucous membranes  NERVOUS SYSTEM:  Alert & Oriented X3, Good concentration  CHEST/LUNG: Clear to auscultation bilaterally  HEART: Regular rate and rhythm  ABDOMEN: Soft, Nontender, Nondistended  EXTREMITIES: no edema        24H events:    Patient is a 31y old Female who presents with a chief complaint of Right upper quadrant abdominal pain (22 Jul 2022 08:27)    Primary diagnosis of Abdominal pain       Today is hospital day 2d.  she developed acute rash and itching from ciprofloxacin.     PAST MEDICAL & SURGICAL HISTORY  PCOS (polycystic ovarian syndrome)    Obesity    Migraines    Hyperlipidemia    S/P appendectomy      SOCIAL HISTORY:  Negative for smoking/alcohol/drug use.     ALLERGIES:  Allergy Status Unknown    MEDICATIONS:  STANDING MEDICATIONS  cefTRIAXone   IVPB 1000 milliGRAM(s) IV Intermittent every 24 hours  lactated ringers. 1000 milliLiter(s) IV Continuous <Continuous>  metroNIDAZOLE  IVPB      metroNIDAZOLE  IVPB 500 milliGRAM(s) IV Intermittent every 8 hours  pantoprazole    Tablet 40 milliGRAM(s) Oral before breakfast    PRN MEDICATIONS  morphine  - Injectable 2 milliGRAM(s) IV Push every 4 hours PRN    VITALS:   T(F): 97.8  HR: 76  BP: 130/76  RR: 20  SpO2: 100%    LABS:                        14.0   8.13  )-----------( 322      ( 22 Jul 2022 07:05 )             40.3     07-22    139  |  101  |  6<L>  ----------------------------<  85  3.9   |  27  |  0.7    Ca    9.5      22 Jul 2022 07:05  Mg     1.8     07-22    TPro  6.9  /  Alb  4.2  /  TBili  0.6  /  DBili  0.2  /  AST  110<H>  /  ALT  358<H>  /  AlkPhos  102  07-22    PT/INR - ( 20 Jul 2022 17:50 )   PT: 11.70 sec;   INR: 1.02 ratio         PTT - ( 20 Jul 2022 17:50 )  PTT:31.5 sec    Culture - Urine (collected 20 Jul 2022 00:35)  Source: Clean Catch Clean Catch (Midstream)  Final Report (21 Jul 2022 07:46):    <10,000 CFU/mL Normal Urogenital Kristine      RADIOLOGY: reviewed     PHYSICAL EXAM:  GENERAL: NAD  EYES: conjunctiva and sclera clear  ENMT: Moist mucous membranes  NERVOUS SYSTEM:  Alert & Oriented X3, Good concentration  CHEST/LUNG: Clear to auscultation bilaterally  HEART: Regular rate and rhythm  ABDOMEN: Soft, Nontender, Nondistended  EXTREMITIES: no edema

## 2022-07-22 NOTE — PROGRESS NOTE ADULT - SUBJECTIVE AND OBJECTIVE BOX
GENERAL SURGERY PROGRESS NOTE    Patient: YOANNA MCKEON , 31y ([birth year])Female   MRN: [id number]  Location: [address]  Visit: 07-20-22 Inpatient  Date: 07-22-22 @ 01:48    Hospital Day #: 2      Events of past 24 hours: No acute events overnight. Pending MRCP     PAST MEDICAL & SURGICAL HISTORY:  PCOS (polycystic ovarian syndrome)      Obesity      Migraines      Hyperlipidemia      S/P appendectomy          Vitals:   T(F): 96.8 (07-21-22 @ 20:51), Max: 97.9 (07-21-22 @ 12:20)  HR: 61 (07-21-22 @ 20:51)  BP: 107/54 (07-21-22 @ 20:51)  RR: 19 (07-21-22 @ 20:51)  SpO2: 100% (07-21-22 @ 20:51)      Diet, Clear Liquid      Fluids: lactated ringers.: Solution, 1000 milliLiter(s) infuse at 100 mL/Hr      I & O's:    Bowel Movement: : [] YES [] NO  Flatus: : [] YES [] NO    PHYSICAL EXAM:  General: NAD, AAOx3, calm and cooperative  Cardiac: RR  Respiratory: Unlabored breathing at rest  Abdomen: Soft, non-distended, tender to deep palpation in RUQ, no guarding or rebound.  Musculoskeletal: Strength 5/5 BL UE/LE, ROM intact, compartments soft  Neuro: Sensation grossly intact and equal throughout, no focal deficits  Skin: Warm/dry, normal color, no jaundice        MEDICATIONS  (STANDING):  ciprofloxacin   IVPB 400 milliGRAM(s) IV Intermittent every 12 hours  lactated ringers. 1000 milliLiter(s) (100 mL/Hr) IV Continuous <Continuous>  metroNIDAZOLE  IVPB      metroNIDAZOLE  IVPB 500 milliGRAM(s) IV Intermittent every 8 hours  pantoprazole    Tablet 40 milliGRAM(s) Oral before breakfast    MEDICATIONS  (PRN):  morphine  - Injectable 2 milliGRAM(s) IV Push every 4 hours PRN Moderate Pain (4 - 6)      DVT PROPHYLAXIS:   GI PROPHYLAXIS: pantoprazole    Tablet 40 milliGRAM(s) Oral before breakfast    ANTICOAGULATION:   ANTIBIOTICS:  ciprofloxacin   IVPB 400 milliGRAM(s)  metroNIDAZOLE  IVPB    metroNIDAZOLE  IVPB 500 milliGRAM(s)            LAB/STUDIES:  Labs:  CAPILLARY BLOOD GLUCOSE                              13.6   7.63  )-----------( 319      ( 21 Jul 2022 09:17 )             39.0       Auto Neutrophil %: 57.2 % (07-21-22 @ 09:17)  Auto Immature Granulocyte %: 0.3 % (07-21-22 @ 09:17)    07-21    139  |  103  |  8<L>  ----------------------------<  77  4.0   |  25  |  0.6<L>      Calcium, Total Serum: 9.2 mg/dL (07-21-22 @ 09:17)      LFTs:             7.4  | 1.6  | 672      ------------------[116     ( 20 Jul 2022 17:50 )  4.1  | x    | 677         Lipase:x      Amylase:x         Lactate, Blood: 2.0 mmol/L (07-19-22 @ 23:45)      Coags:     11.70  ----< 1.02    ( 20 Jul 2022 17:50 )     31.5                    Culture - Urine (collected 20 Jul 2022 00:35)  Source: Clean Catch Clean Catch (Midstream)  Final Report (21 Jul 2022 07:46):    <10,000 CFU/mL Normal Urogenital Kristine              IMAGING:

## 2022-07-23 ENCOUNTER — TRANSCRIPTION ENCOUNTER (OUTPATIENT)
Age: 32
End: 2022-07-23

## 2022-07-23 ENCOUNTER — RESULT REVIEW (OUTPATIENT)
Age: 32
End: 2022-07-23

## 2022-07-23 VITALS
HEART RATE: 68 BPM | TEMPERATURE: 98 F | SYSTOLIC BLOOD PRESSURE: 93 MMHG | RESPIRATION RATE: 20 BRPM | DIASTOLIC BLOOD PRESSURE: 57 MMHG | OXYGEN SATURATION: 100 %

## 2022-07-23 LAB
ALBUMIN SERPL ELPH-MCNC: 4.1 G/DL — SIGNIFICANT CHANGE UP (ref 3.5–5.2)
ALP SERPL-CCNC: 88 U/L — SIGNIFICANT CHANGE UP (ref 30–115)
ALT FLD-CCNC: 309 U/L — HIGH (ref 0–41)
ANION GAP SERPL CALC-SCNC: 17 MMOL/L — HIGH (ref 7–14)
AST SERPL-CCNC: 114 U/L — HIGH (ref 0–41)
BASOPHILS # BLD AUTO: 0.03 K/UL — SIGNIFICANT CHANGE UP (ref 0–0.2)
BASOPHILS NFR BLD AUTO: 0.4 % — SIGNIFICANT CHANGE UP (ref 0–1)
BILIRUB SERPL-MCNC: 0.4 MG/DL — SIGNIFICANT CHANGE UP (ref 0.2–1.2)
BUN SERPL-MCNC: 5 MG/DL — LOW (ref 10–20)
CALCIUM SERPL-MCNC: 9.6 MG/DL — SIGNIFICANT CHANGE UP (ref 8.5–10.1)
CHLORIDE SERPL-SCNC: 107 MMOL/L — SIGNIFICANT CHANGE UP (ref 98–110)
CO2 SERPL-SCNC: 21 MMOL/L — SIGNIFICANT CHANGE UP (ref 17–32)
CREAT SERPL-MCNC: 0.6 MG/DL — LOW (ref 0.7–1.5)
EGFR: 122 ML/MIN/1.73M2 — SIGNIFICANT CHANGE UP
EOSINOPHIL # BLD AUTO: 0.13 K/UL — SIGNIFICANT CHANGE UP (ref 0–0.7)
EOSINOPHIL NFR BLD AUTO: 1.7 % — SIGNIFICANT CHANGE UP (ref 0–8)
GLUCOSE SERPL-MCNC: 85 MG/DL — SIGNIFICANT CHANGE UP (ref 70–99)
HAV IGM SER-ACNC: SIGNIFICANT CHANGE UP
HBV CORE IGM SER-ACNC: SIGNIFICANT CHANGE UP
HBV SURFACE AG SER-ACNC: SIGNIFICANT CHANGE UP
HCT VFR BLD CALC: 41.7 % — SIGNIFICANT CHANGE UP (ref 37–47)
HCV AB S/CO SERPL IA: 0.11 S/CO — SIGNIFICANT CHANGE UP (ref 0–0.99)
HCV AB SERPL-IMP: SIGNIFICANT CHANGE UP
HGB BLD-MCNC: 14.3 G/DL — SIGNIFICANT CHANGE UP (ref 12–16)
IMM GRANULOCYTES NFR BLD AUTO: 0.4 % — HIGH (ref 0.1–0.3)
LYMPHOCYTES # BLD AUTO: 2.21 K/UL — SIGNIFICANT CHANGE UP (ref 1.2–3.4)
LYMPHOCYTES # BLD AUTO: 28.8 % — SIGNIFICANT CHANGE UP (ref 20.5–51.1)
MAGNESIUM SERPL-MCNC: 1.7 MG/DL — LOW (ref 1.8–2.4)
MCHC RBC-ENTMCNC: 31.5 PG — HIGH (ref 27–31)
MCHC RBC-ENTMCNC: 34.3 G/DL — SIGNIFICANT CHANGE UP (ref 32–37)
MCV RBC AUTO: 91.9 FL — SIGNIFICANT CHANGE UP (ref 81–99)
MONOCYTES # BLD AUTO: 0.63 K/UL — HIGH (ref 0.1–0.6)
MONOCYTES NFR BLD AUTO: 8.2 % — SIGNIFICANT CHANGE UP (ref 1.7–9.3)
NEUTROPHILS # BLD AUTO: 4.64 K/UL — SIGNIFICANT CHANGE UP (ref 1.4–6.5)
NEUTROPHILS NFR BLD AUTO: 60.5 % — SIGNIFICANT CHANGE UP (ref 42.2–75.2)
NRBC # BLD: 0 /100 WBCS — SIGNIFICANT CHANGE UP (ref 0–0)
PLATELET # BLD AUTO: 268 K/UL — SIGNIFICANT CHANGE UP (ref 130–400)
POTASSIUM SERPL-MCNC: 4.1 MMOL/L — SIGNIFICANT CHANGE UP (ref 3.5–5)
POTASSIUM SERPL-SCNC: 4.1 MMOL/L — SIGNIFICANT CHANGE UP (ref 3.5–5)
PROT SERPL-MCNC: 6.9 G/DL — SIGNIFICANT CHANGE UP (ref 6–8)
RBC # BLD: 4.54 M/UL — SIGNIFICANT CHANGE UP (ref 4.2–5.4)
RBC # FLD: 12.7 % — SIGNIFICANT CHANGE UP (ref 11.5–14.5)
SODIUM SERPL-SCNC: 145 MMOL/L — SIGNIFICANT CHANGE UP (ref 135–146)
WBC # BLD: 7.67 K/UL — SIGNIFICANT CHANGE UP (ref 4.8–10.8)
WBC # FLD AUTO: 7.67 K/UL — SIGNIFICANT CHANGE UP (ref 4.8–10.8)

## 2022-07-23 PROCEDURE — 88304 TISSUE EXAM BY PATHOLOGIST: CPT | Mod: 26

## 2022-07-23 PROCEDURE — 47562 LAPAROSCOPIC CHOLECYSTECTOMY: CPT

## 2022-07-23 PROCEDURE — 99233 SBSQ HOSP IP/OBS HIGH 50: CPT

## 2022-07-23 RX ORDER — OXYCODONE HYDROCHLORIDE 5 MG/1
1 TABLET ORAL
Qty: 5 | Refills: 0
Start: 2022-07-23

## 2022-07-23 RX ORDER — ACETAMINOPHEN 500 MG
2 TABLET ORAL
Qty: 0 | Refills: 0 | DISCHARGE
Start: 2022-07-23 | End: 2022-07-26

## 2022-07-23 RX ORDER — SODIUM CHLORIDE 9 MG/ML
1000 INJECTION, SOLUTION INTRAVENOUS
Refills: 0 | Status: DISCONTINUED | OUTPATIENT
Start: 2022-07-23 | End: 2022-07-23

## 2022-07-23 RX ORDER — HYDROMORPHONE HYDROCHLORIDE 2 MG/ML
1 INJECTION INTRAMUSCULAR; INTRAVENOUS; SUBCUTANEOUS
Refills: 0 | Status: DISCONTINUED | OUTPATIENT
Start: 2022-07-23 | End: 2022-07-23

## 2022-07-23 RX ORDER — ENOXAPARIN SODIUM 100 MG/ML
40 INJECTION SUBCUTANEOUS EVERY 24 HOURS
Refills: 0 | Status: DISCONTINUED | OUTPATIENT
Start: 2022-07-23 | End: 2022-07-23

## 2022-07-23 RX ORDER — OXYCODONE HYDROCHLORIDE 5 MG/1
5 TABLET ORAL EVERY 6 HOURS
Refills: 0 | Status: DISCONTINUED | OUTPATIENT
Start: 2022-07-23 | End: 2022-07-23

## 2022-07-23 RX ORDER — ONDANSETRON 8 MG/1
4 TABLET, FILM COATED ORAL ONCE
Refills: 0 | Status: DISCONTINUED | OUTPATIENT
Start: 2022-07-23 | End: 2022-07-23

## 2022-07-23 RX ORDER — IBUPROFEN 200 MG
1 TABLET ORAL
Qty: 9 | Refills: 0
Start: 2022-07-23

## 2022-07-23 RX ORDER — HYDROMORPHONE HYDROCHLORIDE 2 MG/ML
0.5 INJECTION INTRAMUSCULAR; INTRAVENOUS; SUBCUTANEOUS
Refills: 0 | Status: DISCONTINUED | OUTPATIENT
Start: 2022-07-23 | End: 2022-07-23

## 2022-07-23 RX ORDER — KETOROLAC TROMETHAMINE 30 MG/ML
30 SYRINGE (ML) INJECTION EVERY 8 HOURS
Refills: 0 | Status: DISCONTINUED | OUTPATIENT
Start: 2022-07-23 | End: 2022-07-23

## 2022-07-23 RX ORDER — SODIUM CHLORIDE 9 MG/ML
500 INJECTION, SOLUTION INTRAVENOUS
Refills: 0 | Status: COMPLETED | OUTPATIENT
Start: 2022-07-23 | End: 2022-07-23

## 2022-07-23 RX ORDER — ACETAMINOPHEN 500 MG
1000 TABLET ORAL ONCE
Refills: 0 | Status: COMPLETED | OUTPATIENT
Start: 2022-07-23 | End: 2022-07-23

## 2022-07-23 RX ORDER — ACETAMINOPHEN 500 MG
650 TABLET ORAL EVERY 6 HOURS
Refills: 0 | Status: DISCONTINUED | OUTPATIENT
Start: 2022-07-23 | End: 2022-07-23

## 2022-07-23 RX ORDER — MAGNESIUM SULFATE 500 MG/ML
2 VIAL (ML) INJECTION ONCE
Refills: 0 | Status: COMPLETED | OUTPATIENT
Start: 2022-07-23 | End: 2022-07-23

## 2022-07-23 RX ADMIN — PANTOPRAZOLE SODIUM 40 MILLIGRAM(S): 20 TABLET, DELAYED RELEASE ORAL at 06:01

## 2022-07-23 RX ADMIN — Medication 100 MILLIGRAM(S): at 05:07

## 2022-07-23 RX ADMIN — SODIUM CHLORIDE 250 MILLILITER(S): 9 INJECTION, SOLUTION INTRAVENOUS at 15:11

## 2022-07-23 RX ADMIN — Medication 1000 MILLIGRAM(S): at 10:50

## 2022-07-23 RX ADMIN — Medication 25 GRAM(S): at 10:10

## 2022-07-23 RX ADMIN — Medication 30 MILLIGRAM(S): at 13:00

## 2022-07-23 RX ADMIN — Medication 650 MILLIGRAM(S): at 17:10

## 2022-07-23 RX ADMIN — HYDROMORPHONE HYDROCHLORIDE 1 MILLIGRAM(S): 2 INJECTION INTRAMUSCULAR; INTRAVENOUS; SUBCUTANEOUS at 12:40

## 2022-07-23 NOTE — PROGRESS NOTE ADULT - SUBJECTIVE AND OBJECTIVE BOX
24H events:    Patient is a 31y old Female who presents with a chief complaint of Right upper quadrant abdominal pain (22 Jul 2022 08:27)    Primary diagnosis of Abdominal pain    Today is hospital day 3d.  she developed acute rash and itching from ciprofloxacin.     PAST MEDICAL & SURGICAL HISTORY  PCOS (polycystic ovarian syndrome)    Obesity    Migraines    Hyperlipidemia    S/P appendectomy      SOCIAL HISTORY:  Negative for smoking/alcohol/drug use.     ALLERGIES:  Allergy Status Unknown    MEDICATIONS:  STANDING MEDICATIONS  cefTRIAXone   IVPB 1000 milliGRAM(s) IV Intermittent every 24 hours  lactated ringers. 1000 milliLiter(s) IV Continuous <Continuous>  metroNIDAZOLE  IVPB      metroNIDAZOLE  IVPB 500 milliGRAM(s) IV Intermittent every 8 hours  pantoprazole    Tablet 40 milliGRAM(s) Oral before breakfast    PRN MEDICATIONS  morphine  - Injectable 2 milliGRAM(s) IV Push every 4 hours PRN    VITALS:   T(C): 36.7 (23 Jul 2022 12:28), Max: 36.9 (23 Jul 2022 10:30)  T(F): 98 (23 Jul 2022 12:28), Max: 98.4 (23 Jul 2022 10:30)  HR: 67 (23 Jul 2022 13:10) (53 - 75)  BP: 100/60 (23 Jul 2022 13:10) (94/56 - 121/66)  RR: 13 (23 Jul 2022 13:10) (13 - 22)  SpO2: 99% (23 Jul 2022 13:10) (95% - 100%)    Parameters below as of 23 Jul 2022 13:10  Patient On (Oxygen Delivery Method): room air      LABS:                        14.3   7.67  )-----------( 268      ( 23 Jul 2022 07:24 )             41.7     07-23    145  |  107  |  5<L>  ----------------------------<  85  4.1   |  21  |  0.6<L>    Ca    9.6      23 Jul 2022 07:24    Mg     1.7     07-23    TPro  6.9  /  Alb  4.1  /  TBili  0.4  /  DBili  x   /  AST  114<H>  /  ALT  309<H>  /  AlkPhos  88  07-23    Culture - Urine (collected 20 Jul 2022 00:35)  Source: Clean Catch Clean Catch (Midstream)  Final Report (21 Jul 2022 07:46)  <10,000 CFU/mL Normal Urogenital Kristine      RADIOLOGY: reviewed     SONO  Cholelithiasis without definite evidence of cholecystitis. Ifclinically   warranted, nuclear medicine HIDA scan may be of benefit    Mildly dilated CBD. MRI/MRCP may be of benefit.    CT   IMPRESSION:      No CT evidence of acute intra-abdominal pelvic pathology.    Few sigmoid diverticula without definite evidence of diverticulitis.    Possible tumefactive sludge versus mass within the gallbladder lumen. Outpatient right upper quadrant ultrasound may beof benefit.    CXR  Impression:    No radiographic evidence of acute cardiopulmonary disease.      PHYSICAL EXAM:  GENERAL: NAD  EYES: conjunctiva and sclera clear  ENMT: Moist mucous membranes  NERVOUS SYSTEM:  Alert & Oriented X3, Good concentration  CHEST/LUNG: Clear to auscultation bilaterally  HEART: Regular rate and rhythm  ABDOMEN: Soft, Nontender, Nondistended  EXTREMITIES: no edema  MS: AOx3

## 2022-07-23 NOTE — PROGRESS NOTE ADULT - SUBJECTIVE AND OBJECTIVE BOX
GENERAL SURGERY PROGRESS NOTE    Patient: YOANNA MCKEON , 31y ([birth year])Female   MRN: [id number]  Location: [facility] T1-3A 031 B  Visit: 07-20-22 Inpatient  Date: 07-23-22 @ 02:09    Hospital Day #: 5  Post-Op Day #:N/A    Procedure/Dx/Injuries: abdominal pain, nausea,    Events of past 24 hours:  -NAEON  -Patient was seen and examined at bedside  -Patient continues to complain of intermittent abdominal pain  -Patient is added on OR for Sunday Lap Danielle possible IOC, possible open  -Patient is OOB, voiding independently    PAST MEDICAL & SURGICAL HISTORY:  PCOS (polycystic ovarian syndrome)      Obesity  Migraines  Hyperlipidemia  S/P appendectomy      Vitals:   T(F): 97.7 (07-22-22 @ 20:58), Max: 97.8 (07-22-22 @ 13:00)  HR: 65 (07-22-22 @ 20:58)  BP: 105/69 (07-22-22 @ 20:58)  RR: 19 (07-22-22 @ 20:58)  SpO2: 96% (07-22-22 @ 20:58)    Diet, Clear Liquid  Fluids:     I & O's:        MEDICATIONS  (STANDING):  cefTRIAXone   IVPB 1000 milliGRAM(s) IV Intermittent every 24 hours  lactated ringers. 1000 milliLiter(s) (100 mL/Hr) IV Continuous <Continuous>  metroNIDAZOLE  IVPB      metroNIDAZOLE  IVPB 500 milliGRAM(s) IV Intermittent every 8 hours  pantoprazole    Tablet 40 milliGRAM(s) Oral before breakfast    MEDICATIONS  (PRN):  morphine  - Injectable 2 milliGRAM(s) IV Push every 4 hours PRN Moderate Pain (4 - 6)      DVT PROPHYLAXIS:   GI PROPHYLAXIS: pantoprazole    Tablet 40 milliGRAM(s) Oral before breakfast    ANTICOAGULATION:   ANTIBIOTICS:  cefTRIAXone   IVPB 1000 milliGRAM(s)  metroNIDAZOLE  IVPB    metroNIDAZOLE  IVPB 500 milliGRAM(s)    LAB/STUDIES:  Labs:  CAPILLARY BLOOD GLUCOSE                          14.0   8.13  )-----------( 322      ( 22 Jul 2022 07:05 )             40.3       Auto Neutrophil %: 63.1 % (07-22-22 @ 07:05)  Auto Immature Granulocyte %: 0.4 % (07-22-22 @ 07:05)    07-22    139  |  101  |  6<L>  ----------------------------<  85  3.9   |  27  |  0.7      Calcium, Total Serum: 9.5 mg/dL (07-22-22 @ 07:05)      LFTs:             6.9  | 0.6  | 110      ------------------[102 ( 22 Jul 2022 07:05 )  4.2  | 0.2  | 358

## 2022-07-23 NOTE — BRIEF OPERATIVE NOTE - ASSISTANT(S)
Reason for Disposition   [1] Blurred vision or visual changes AND [2] present now AND [3] sudden onset or new (e.g., minutes, hours, days)  (Exception: previously diagnosed migraine headaches with same symptoms)    Protocols used:  VISION LOSS OR CHANGE-A-ELIAS    Leesa reporting multiple symptoms that he states started two days ago. He states his vision is blurred and getting worse. He has numbness to his bilateral arms, face. He states is speech seems slurred. He is not sure if it could be related to new started ambien. Advised that is possible but he should go to ED for evaluation of these multiple symptoms to verify a diagnosis. He agrees to plan. Please contact caller with any further care advice.    Titus

## 2022-07-23 NOTE — DISCHARGE NOTE PROVIDER - HOSPITAL COURSE
31yF with PMH of Migraine, PCOS, HLD and Obesity, present to the ED with sudden onset of RUQ abdominal pain, that started suddenly 3 days ago, episodic in nature, progressively worsening, radiating to the back and left shoulder. She describes the pain as sharp in nature but was dull initially, 9/10 in intensity but gets better with pain medications. It has no aggravating or relieving factors and is associated with Nausea and Nonbilious vomiting x 1.  She recently had a back pain which radiated down to the legs after weight lifting and was prescribed with muscle relaxant by the ED weeks ago. She is not taking any weight loss medications, and is on Norethindrone as contraceptive. She recently started using Tums for her acid reflux which she attributed it was due to pain.  In ED her vitals were:  BP: 127/59, HR 84, RR 18, Temp 97.6, O2 sat 100% RA (20 Jul 2022 09:13)"    Reason for surgical consult: Suspected choledocholithiasis, eval for laparoscopic cholecystectomy.    Patient underwent an uncomplicated laparoscopic cholecystectomy. Post-op she was ambulating, tolerating her diet with only one incidence of mild nausea, and was pulling 1500 on IS.     Patient is stable. She will follow up with Dr. Gimenez in two weeks. 31yF with PMH of Migraine, PCOS, HLD and Obesity, present to the ED with sudden onset of RUQ abdominal pain, that started suddenly 3 days ago, episodic in nature, progressively worsening, radiating to the back and left shoulder. She describes the pain as sharp in nature but was dull initially, 9/10 in intensity but gets better with pain medications. It has no aggravating or relieving factors and is associated with Nausea and Nonbilious vomiting x 1.  She recently had a back pain which radiated down to the legs after weight lifting and was prescribed with muscle relaxant by the ED weeks ago. She is not taking any weight loss medications, and is on Norethindrone as contraceptive. She recently started using Tums for her acid reflux which she attributed it was due to pain.  In ED her vitals were:  BP: 127/59, HR 84, RR 18, Temp 97.6, O2 sat 100% RA (20 Jul 2022 09:13)"    Reason for surgical consult: Suspected choledocholithiasis, eval for laparoscopic cholecystectomy.    She underwent an uncomplicated laparoscopic  cholecystectomy on 7/23. Post-op check showed minimal tenderness to palpation around incision sites. She was ambulating, having bowel movements, voiding, pulling 1500 on IS, and tolerating a regular diet with only one incidence of mild nausea and no vomiting.     Patient is stable and will be discharged home. She will be following up with Dr. Gimenez in two weeks.

## 2022-07-23 NOTE — CHART NOTE - NSCHARTNOTEFT_GEN_A_CORE
Patient is S/P laparoscopic cholecystectomy. She has been tolerating a regular diet. She reported only momentary nausea that dissipated after drinking some ginger ale and has not returned since. She has no vomiting, she's ambulating, pulling 1500 on IS. -BM, -Flatus.

## 2022-07-23 NOTE — PROGRESS NOTE ADULT - REASON FOR ADMISSION
Right upper quadrant abdominal pain

## 2022-07-23 NOTE — PROGRESS NOTE ADULT - TIME BILLING
direct pt care and interdisciplinary rounds

## 2022-07-23 NOTE — DISCHARGE NOTE PROVIDER - NSDCCPCAREPLAN_GEN_ALL_CORE_FT
PRINCIPAL DISCHARGE DIAGNOSIS  Diagnosis: Abdominal pain  Assessment and Plan of Treatment: Follow Up with Dr. Gimenez in 2 weeks. Please call office for confirmation of your appointment.  Diet: Please avoid fatty, greasy, or spicy foods for the first two days. After that point, you may start to incorprate them back into your diet as tolerated.  Pain: Please take the Tylenol and Motrin as prescribed for 3 days. Please avoid takin other pain medications with these. Please adhere to the instructions on the back of the bottle.   Antibiotics: You do not require antibiotics.  If you develop fevers, chills, worsening pain, increased drainage from the wound, foul smelling drainage from the wound, nausea that won't subside, vomiting, or any other symptoms of concern, please call MD for further advice, evaluation, and/or treatment.  Activity: Ambulate and get out of bed as tolerated, and with assistance if feeling weak.  Wound Care: You may shower as you wish. Leave all wound dressings in place for two days. After two days you can remove the dressings but please leave the steri strips in place. You have a small suture near your umbilical but it will dissolve on it's own.      SECONDARY DISCHARGE DIAGNOSES  Diagnosis: Dilation of common bile duct  Assessment and Plan of Treatment:     Diagnosis: Transaminitis  Assessment and Plan of Treatment:      PRINCIPAL DISCHARGE DIAGNOSIS  Diagnosis: Acute cholecystitis  Assessment and Plan of Treatment: Assessment and Plan of Treatment: S/P laparoscopic cholecystectomy  Follow Up with Dr. Gimenez in 2 weeks. Please call office for confirmation of your appointment.  Diet: Avoid fatty, greasy, or spicy foods for the first couple of days. After that, you can start incorporating them back into your diet as tolerated.  Pain: Please take the prescribe Tylenol and Motrin for three days as directed for pain control. You may take Oxycodone 5mg every 6-8 hours as needed for breathrough, severe pain. Do not take this medication if you plan to drive or make imporatant decisions.  Antibiotics: You do not require any antibiotics.  If you develop fevers, chills, woresening pain, increased drainage from the wound, foul smelling drainage from the wound, nausea that won't subside, vomiting, or any other symptoms of concern, please call MD for further advice, evaluation, and/or treatment.   Activity: Ambulate and get out of bed as tolerated, and with assistance if feelin weak. No heavy lifting over 10 pounds for 4 weeks.   Dress: Keep your dressings in place for 5-7 days. Durin this time you may shower normally (the dressings are waterproof). You tae remove them after 2 days. Do not take the steri strips off, they will fall off by themselves. You have some sutures in your umbilicus-- leave them alone; they will eventually dissolve. No soaking (bath, ocean, pool, etc.) for 4 weeks.      SECONDARY DISCHARGE DIAGNOSES  Diagnosis: Dilation of common bile duct  Assessment and Plan of Treatment:     Diagnosis: Transaminitis  Assessment and Plan of Treatment:

## 2022-07-23 NOTE — PROGRESS NOTE ADULT - ASSESSMENT
31 years old, Female with PMH of Migraine, PCOS, HLD and Obesity, present to the ED with sudden onset of RUQ abdominal pain.      # RUQ pain due to Cholelithiasis  - Her pain resolved  - US abdomen showed Cholelithiasis without definite cholecystitis, MRCP recommended   - WBC on admission 17K   - AST/ALT trending up reaching 600, TB 1.6  - GI consulted:  Clear liquid diet, PPI once a day,  surgery eval  - Surgery consulted: send full hepatitis profile, possible medications induced hepatitis   - follow up MRCP  - follow up labs  - follow up with GI and surgery   - morphine PRN for pain  - start ciprofloxacin and flagyl   - start LR rate 100ml/hr       #Migraines  - on Aimovig once monthly, last dose was early in July     # HLD:  - Lipid profile Cholesterol 205, triglyceride 103, HDL 59,   - on fenofibrate at home, keep on hold for now due to elevated LFT       # PCOS  - was on norethindrone at home, on hold due to elevated LFT        Diet: clear fluid diet   DVT: None  Dispo: Home  GI ppx: pantoprazole                 
31 years old, Female with PMH of Migraine, PCOS, HLD and Obesity, present to the ED with sudden onset of RUQ abdominal pain.    # RUQ pain w/ transaminitis and CBD dilation on sonogram   - Gall stones Present - No signs on acute serge   - Leukocytosis wbc 18 rr 20s'   - Sepsis POA 2/2 Acute Cholecystitis w/ Choledocholithiasis   - Transaminitis trending down  - f/u hep panel   - MRCP scheduled for Sunday  - NPO for OR today   - GI f/u post of   - c/w f/u LFTs daily   - c/w Ceftriaxone and Flagyl   - Morphine for pain control or Toradol prn   - D5LR 75cc/hr     # Hypomagnesemia  - 2g Mg Sulfate IV    # Left arm rash and hives from ciprofloxacin  - occured once yesterday, resolved with benadryl  - switch cipro to ceftriaxone      #Migraines  - on Aimovig once monthly, last dose was early in July     # HLD:  - Lipid profile Cholesterol 205, triglyceride 103, HDL 59,   - on fenofibrate at home, keep on hold for now due to elevated LFT     # PCOS  - was on norethindrone at home, on hold due to elevated LFT      Diet: clear fluid diet   DVT: None  Dispo: Home  GI ppx: pantoprazole     Dispo: OR today / Pending MRCP scheduled for sunday / OR today             
Patient is a 31 years old Female with PMHx of Migraine, PCOS, HLD and Obesity, who presented to the ED with sudden onset of RUQ abdominal pain, that started suddenly, episodic in nature, progressively worsening, radiating to the back and left shoulder. She describes the pain as sharp in nature but was dull initially, 9/10 in intensity but gets better with pain medications. It has no aggravating or relieving factors and is associated with Nausea and Nonbilious vomiting x 1. She is admitted and still awaiting MRCP. Pain controlled. No overnight events. If MRCP notes CBD stone will plan ERCP.     RUQ abdominal pain--> acute cholecystitis vs biliary colic vs choledocholithiasis vs PUD  - U/S with Cholelithiasis and ductal dialtion  - Slightly elevated T jeremiah  - Clear liquid diet  - PPI once a day  - Monitor LFts and CBC  - MRCP if choledocholithiasis will plan for ERCP    
31 years old, Female with PMH of Migraine, PCOS, HLD and Obesity, present to the ED with sudden onset of RUQ abdominal pain.      # RUQ pain  Impression: Cholelithiasis VS slipped choledocholithiasis   - Her pain resolved  - US abdomen showed Cholelithiasis without definite cholecystitis, MRCP recommended   - WBC on admission 17K, now normal   - AST/ALT trending down reaching 110/358, bilirubin normalized   - GI consulted:  Clear liquid diet, PPI once a day,  surgery eval  - Surgery consulted: send full hepatitis profile, possible medications induced hepatitis, follow up MRCP   - follow up MRCP, it will be done by tomorrow or even Sunday per MRI   - follow up labs  - follow up with GI and surgery   - morphine PRN for pain  - switch ciprofloxacin to ceftriaxone   - continue flagyl  - discharge her on vantin 200X2 and flagyl 500X3 for total of 7 days ( day 1 was 7/21 )  - continue LR rate 100ml/hr     # Left arm rash and hives from ciprofloxacin  - occured once yesterday, resolved with benadryl  - switch cipro to ceftriaxone      #Migraines  - on Aimovig once monthly, last dose was early in July     # HLD:  - Lipid profile Cholesterol 205, triglyceride 103, HDL 59,   - on fenofibrate at home, keep on hold for now due to elevated LFT       # PCOS  - was on norethindrone at home, on hold due to elevated LFT        Diet: clear fluid diet   DVT: None  Dispo: Home  GI ppx: pantoprazole   Case discussed with the attending             
ASSESSMENT:  31yF w/ PMHx of  PCOS, presents with clinical and radiographic findings concerning for choledocholithiasis, currently pending MRCP.    PLAN:  - F/U MRCP   - Trend LFTs, T bili, D bili   - Pain control   - IS  - Encourage ambulation   - Care as per primary team     [id number]
Assessment and Plan:   · Assessment	  ASSESSMENT:  31yF w/ PMHx of  PCOS, presents with clinical and radiographic findings concerning for choledocholithiasis, currently pending MRCP.    PLAN:  -Added on or OR Sunday for Laparoscopic Danielle, possible IOC, possible open.   -Consent in chart  - F/U MRCP   -GI: f/u MRCP if positive, possible ERCP  - Trend LFTs, T bili, D bili   - Pain control   - IS  - Encourage ambulation   - Care as per primary team     [phone]

## 2022-07-23 NOTE — DISCHARGE NOTE PROVIDER - CARE PROVIDER_API CALL
Hui Gimenez)  Surgery; Surgical Critical Care  [address] [place] [address]  Phone: [phone]  Fax: [phone]  Follow Up Time: 2 weeks

## 2022-07-23 NOTE — DISCHARGE NOTE PROVIDER - NSDCMRMEDTOKEN_GEN_ALL_CORE_FT
acetaminophen 325 mg oral tablet: 2 tab(s) orally every 6 hours  Aimovig:   fenofibrate 48 mg oral tablet: 1 tab(s) orally once a day  ibuprofen 400 mg oral tablet: 1 tab(s) orally every 8 hours  Multiple Vitamins oral tablet: 1 tab(s) orally once a day  norethindrone 0.35 mg oral tablet: 1 tab(s) orally once a day  Zofran 4 mg oral tablet: 1 tab(s) orally 2 times a day -for nausea

## 2022-07-23 NOTE — CHART NOTE - NSCHARTNOTEFT_GEN_A_CORE
PACU ANESTHESIA ADMISSION NOTE      Procedure: laparoscopic cholecystectomy  Post op diagnosis:  cholecystitis     ____  Intubated  TV:______       Rate: ______      FiO2: ______    _x___  Patent Airway    _x___  Full return of protective reflexes    _x___  Full recovery from anesthesia / back to baseline status    Vitals:  T(F): 98.2   HR: 57  BP: 108/57  RR: 16  SpO2: 99%    Mental Status:  _x___ Awake   __x___ Alert   _____ Drowsy   _____ Sedated    Nausea/Vomiting:  _x___  NO       ______Yes,   See Post - Op Orders         Pain Scale (0-10):  __0___    Treatment: _x___ None    ____ See Post - Op/PCA Orders    Post - Operative Fluids:   __x__ Oral   ____ See Post - Op Orders    Plan: Discharge:   ____Home       __x___Floor     _____Critical Care    _____  Other:_________________    Comments:  No anesthesia issues or complications noted.  Discharge when criteria met.

## 2022-07-23 NOTE — DISCHARGE NOTE NURSING/CASE MANAGEMENT/SOCIAL WORK - PATIENT PORTAL LINK FT
You can access the FollowMyHealth Patient Portal offered by [facility] by registering at the following website: [email or web address]. By joining [employer]’s FollowMyHealth portal, you will also be able to view your health information using other applications (apps) compatible with our system.

## 2022-07-26 PROBLEM — E66.9 OBESITY, UNSPECIFIED: Chronic | Status: ACTIVE | Noted: 2022-07-20

## 2022-07-26 PROBLEM — Z00.00 ENCOUNTER FOR PREVENTIVE HEALTH EXAMINATION: Status: ACTIVE | Noted: 2022-07-26

## 2022-07-26 PROBLEM — E28.2 POLYCYSTIC OVARIAN SYNDROME: Chronic | Status: ACTIVE | Noted: 2022-07-20

## 2022-07-26 PROBLEM — E78.5 HYPERLIPIDEMIA, UNSPECIFIED: Chronic | Status: ACTIVE | Noted: 2022-07-20

## 2022-07-26 PROBLEM — G43.909 MIGRAINE, UNSPECIFIED, NOT INTRACTABLE, WITHOUT STATUS MIGRAINOSUS: Chronic | Status: ACTIVE | Noted: 2022-07-20

## 2022-07-28 LAB — SURGICAL PATHOLOGY STUDY: SIGNIFICANT CHANGE UP

## 2022-08-04 ENCOUNTER — NON-APPOINTMENT (OUTPATIENT)
Age: 32
End: 2022-08-04

## 2022-08-05 ENCOUNTER — APPOINTMENT (OUTPATIENT)
Dept: SURGERY | Facility: CLINIC | Age: 32
End: 2022-08-05

## 2022-08-05 VITALS
HEIGHT: 62 IN | HEART RATE: 87 BPM | OXYGEN SATURATION: 99 % | BODY MASS INDEX: 33.31 KG/M2 | TEMPERATURE: 97.3 F | DIASTOLIC BLOOD PRESSURE: 80 MMHG | WEIGHT: 181 LBS | SYSTOLIC BLOOD PRESSURE: 101 MMHG

## 2022-08-05 PROCEDURE — 99024 POSTOP FOLLOW-UP VISIT: CPT

## 2022-08-11 NOTE — CDI QUERY NOTE - NSCDIOTHERTXTBX2_GEN_ALL_CORE_FT
Documentation:  ** 7/23 Discharge Summary:        - Hospital Course: ...... present to the ED with sudden onset of RUQ abdominal pain, that started suddenly 3 days ago, episodic in nature, progressively worsening, radiating to the back and left shoulder. She describes the pain as sharp in nature but was dull initially, 9/10 in intensity but gets better with pain medications. It has no aggravating or relieving factors and is associated with Nausea and Nonbilious vomiting x 1.       - Principal diagnosis: Acute cholecystitis    **  7/24 Operative report:           Postoperative diagnosis: cholelithiasis with cholecystitis    ** 7/24 Path report:        Final Diagnosis: Gallbladder, laparoscopic cholecystectomy:          -The non-neoplastic gallbladder showing  chronic cholecystitis.           Vital:  ED: T 97.6, HR 84, /59, RR 18  In house: T 96.1, HR 64, /69, RR 18 (7/20 @ 16:09)    Lab:  WBC: 17.95 (7/19 @ 23:45)  Lactate: 2 (7/19 @ 23:45)    Orders:   ** 7/21 - 7/23: Metronidazole 500 mg IV / 8 Hrs. Indication: possible cholangitis  ** 7/22 - 7/23: Rocephin 1000 mg IV / 24 Hrs. Indication: intra-abdominal infection.                                                      Query:  Based on your clinical judgment and consideration of these clinical indicators, please clarify if cholecystitis can be further specified as:  • Acute cholecystitis  • Chronic cholecystitis	  • Other (please specify).  • Clinically unable to further specify cholecystitis

## 2022-08-11 NOTE — CDI QUERY NOTE - NSCDIOTHERTXTBX_GEN_ALL_CORE_HH
Documentation:  ** 7/19 ED note:       - HPI: ... resents to the ED c/o progressively worsening RUQ pain that started 3 days ago. Pain is sharp, intermittent,  non-radiating, associated with nausea and one episode of NBNB vomiting. Pt      - SEPSIS – was this patient treated for sepsis? No    ** 7/22 PN Hospitalist: Attestation: 31F with RUQ Abdominal Pain w/ Gallstones and transaminitis        - suspected choledocholithiasis and possible stone has passed (Tbili low and LFTs trending down wbc trending down)       - sepsis poa        - Ceftriaxone 1g IV qd       - Flagyl 500mg IV q8h      - d/c IVF      - Blood cxs NTD    **  7/24 Operative report:           Postoperative diagnosis: cholelithiasis with cholecystitis    ** 7/24 Path report:        Final Diagnosis: Gallbladder, laparoscopic cholecystectomy:          -The non-neoplastic gallbladder showing  chronic cholecystitis.           Vital:  ED: T 97.6, HR 84, /59, RR 18  In house: T 96.1, HR 64, /69, RR 18 (7/20 @ 16:09)    Lab:  WBC: 17.95 (7/19 @ 23:45)  Lactate: 2 (7/19 @ 23:45)    Orders:   ** 7/21 - 7/23: Metronidazole 500 mg IV / 8 Hrs. Indication: possible cholangitis  ** 7/22 - 7/23: Rocephin 1000 mg IV / 24 Hrs. Indication: intra-abdominal infection.                                                          Query  Based on your clinical judgment and consideration of these clinical indicators, please clarify if sepsis can be further specified as:  • Patient was evaluated for sepsis and was ruled out  • Patient was treated for sepsis during this admission (please document clinical indicators)	  • Other (please specify).  • Unable to further specify sepsis Documentation:  ** 7/19 ED note:       - HPI: ... resents to the ED c/o progressively worsening RUQ pain that started 3 days ago. Pain is sharp, intermittent,  non-radiating, associated with nausea and one episode of NBNB vomiting. Pt      - SEPSIS – was this patient treated for sepsis? No    ** 7/22 PN Hospitalist: Attestation: 31F with RUQ Abdominal Pain w/ Gallstones and transaminitis        - suspected choledocholithiasis and possible stone has passed (Tbili low and LFTs trending down wbc trending down)       - sepsis poa        - Ceftriaxone 1g IV qd       - Flagyl 500mg IV q8h      - d/c IVF      - Blood cxs NTD    **  7/24 Operative report:           Postoperative diagnosis: cholelithiasis with cholecystitis    ** 7/24 Path report:        Final Diagnosis: Gallbladder, laparoscopic cholecystectomy:          -The non-neoplastic gallbladder showing  chronic cholecystitis.           Vital:  ED: T 97.6, HR 84, /59, RR 18  In house: T 96.1, HR 64, /69, RR 18 (7/20 @ 16:09)    Lab:  WBC: 17.95 (7/19 @ 23:45)  Lactate: 2 (7/19 @ 23:45)    Orders:   ** 7/21 - 7/23: Metronidazole 500 mg IV / 8 Hrs. Indication: possible cholangitis  ** 7/22 - 7/23: Rocephin 1000 mg IV / 24 Hrs. Indication: intra-abdominal infection.                                                            Query  Based on your clinical judgment and consideration of these clinical indicators, please clarify if sepsis can be further specified as:  • Patient was evaluated for sepsis and was ruled out  • Patient was treated for sepsis during this admission (please document clinical indicators)	  • Other (please specify).  • Unable to further specify sepsis

## 2022-08-11 NOTE — CDI QUERY NOTE - NSCDIOTHERTXTBX_GEN_ALL_CORE_HH
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           -No clear-cut histologic evidence of invasion identified.  See note below       Note: Sections show  fragments of polypoid intracystic neoplasm with high-grade dysplasia/in-situ carcinoma.  The carcinoma is mainly in exophytic growth pattern and no invasive component identified.  The resection margin appears free of dysplasia or malignancy.  There is no invasion or lymphovascular permeation identified, although entirely specimen is submitted for microscopic examination                                                                  Query:  Do you agree with the pathology report specifying High-grade dysplasia/in-situ adenocarcinoma?  • Yes, Agree with pathology report. 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  • No, disagree with pathology findings  • Other diagnosis (please clarify)  • Clinically unable to determine

## 2022-08-30 ENCOUNTER — APPOINTMENT (OUTPATIENT)
Dept: SURGERY | Facility: CLINIC | Age: 32
End: 2022-08-30

## 2022-08-30 VITALS
DIASTOLIC BLOOD PRESSURE: 62 MMHG | TEMPERATURE: 97.5 F | OXYGEN SATURATION: 98 % | HEIGHT: 62 IN | SYSTOLIC BLOOD PRESSURE: 104 MMHG | BODY MASS INDEX: 33.68 KG/M2 | WEIGHT: 183 LBS | HEART RATE: 74 BPM

## 2022-08-30 DIAGNOSIS — K80.01 CALCULUS OF GALLBLADDER WITH ACUTE CHOLECYSTITIS WITH OBSTRUCTION: ICD-10-CM

## 2022-08-30 PROCEDURE — 99214 OFFICE O/P EST MOD 30 MIN: CPT

## 2022-09-02 NOTE — ASSESSMENT
[FreeTextEntry1] : YOANNA MCKEON  is a pleasant 31 year year old woman  who came in 08/30/2022 for incidental finding of CIS in GB polyp after cholecystectomy 7/23/2022.\par \par path report showed carcinoma in-situ, disruption of GB at end with potential spillage of biliary content\par \par 8/30/2022 will discuss at GI tumor board, discussed if spillage of cancer cells to peritoneal cavity then no benefit of further surgical resection, if no spillage then no need for futher rx, will cont observation with repeated imaging, no need for diagnostic laparoscopy\par \par \par the above plan of care with discussed in details to the patient and all questions were answered to patient satisfaction. patient instructed to follow up with the referring physician and patient primary care provider\par

## 2022-09-02 NOTE — HISTORY OF PRESENT ILLNESS
[de-identified] : YOANNA MCKEON  is a pleasant 31 year year old woman  who came in 08/30/2022 for incidental finding of CIS in GB polyp after cholecystectomy 7/23/2022.\par \par path report showed carcinoma in-situ, disruption of GB at end with potential spillage of biliary content\par

## 2022-09-02 NOTE — CONSULT LETTER
[Dear  ___] : Dear  [unfilled], [Consult Letter:] : I had the pleasure of evaluating your patient, [unfilled]. [Please see my note below.] : Please see my note below. [Consult Closing:] : Thank you very much for allowing me to participate in the care of this patient.  If you have any questions, please do not hesitate to contact me. [Sincerely,] : Sincerely, [FreeTextEntry3] : Macy Bradshaw MD

## 2022-09-15 ENCOUNTER — TRANSCRIPTION ENCOUNTER (OUTPATIENT)
Age: 32
End: 2022-09-15

## 2022-09-15 ENCOUNTER — NON-APPOINTMENT (OUTPATIENT)
Age: 32
End: 2022-09-15

## 2022-09-15 ENCOUNTER — APPOINTMENT (OUTPATIENT)
Dept: SURGERY | Facility: CLINIC | Age: 32
End: 2022-09-15

## 2022-09-15 PROCEDURE — 99442: CPT

## 2022-09-17 NOTE — ASSESSMENT
[FreeTextEntry1] : YOANNA MCKEON  is a pleasant 31 year year old woman  who came in 08/30/2022 for incidental finding of CIS in GB polyp after cholecystectomy 7/23/2022.\par \par path report showed carcinoma in-situ, disruption of GB at end with potential spillage of biliary content\par \par 8/30/2022 will discuss at GI tumor board, discussed if spillage of cancer cells to peritoneal cavity then no benefit of further surgical resection, if no spillage then no need for futher rx, will cont observation with repeated imaging, no need for diagnostic laparoscopy\par \par 9/15/2022 telehealth phone call, discussed with her GI tumor board discussion and input, will plan for diagnostic laparoscopy, peritoneal lavage and portal lymph node sampling after pan CT, will see her to discuss after CAP CT\par the above plan of care with discussed in details to the patient and all questions were answered to patient satisfaction. patient instructed to follow up with the referring physician and patient primary care provider\par

## 2022-09-17 NOTE — HISTORY OF PRESENT ILLNESS
[de-identified] : YOANNA MCKEON  is a pleasant 31 year year old woman  who came in 08/30/2022 for incidental finding of CIS in GB polyp after cholecystectomy 7/23/2022.\par \par path report showed carcinoma in-situ, disruption of GB at end with potential spillage of biliary content\par \par 9/15/2022 telehealth phone call, discussed with her GI tumor board discussion and input, will plan for diagnostic laparoscopy, peritoneal lavage and portal lymph node sampling after pan CT, will see her to discuss after CAP CT

## 2022-09-26 ENCOUNTER — NON-APPOINTMENT (OUTPATIENT)
Age: 32
End: 2022-09-26

## 2022-09-26 ENCOUNTER — RESULT REVIEW (OUTPATIENT)
Age: 32
End: 2022-09-26

## 2022-10-02 ENCOUNTER — OUTPATIENT (OUTPATIENT)
Dept: OUTPATIENT SERVICES | Facility: HOSPITAL | Age: 32
LOS: 1 days | Discharge: HOME | End: 2022-10-02

## 2022-10-02 DIAGNOSIS — D09.9 CARCINOMA IN SITU, UNSPECIFIED: ICD-10-CM

## 2022-10-02 DIAGNOSIS — Z90.49 ACQUIRED ABSENCE OF OTHER SPECIFIED PARTS OF DIGESTIVE TRACT: Chronic | ICD-10-CM

## 2022-10-02 PROCEDURE — 71260 CT THORAX DX C+: CPT | Mod: 26

## 2022-10-02 PROCEDURE — 74178 CT ABD&PLV WO CNTR FLWD CNTR: CPT | Mod: 26

## 2022-10-06 ENCOUNTER — APPOINTMENT (OUTPATIENT)
Dept: SURGERY | Facility: CLINIC | Age: 32
End: 2022-10-06
Payer: COMMERCIAL

## 2022-10-06 VITALS
HEART RATE: 93 BPM | HEIGHT: 62 IN | DIASTOLIC BLOOD PRESSURE: 84 MMHG | TEMPERATURE: 97.4 F | SYSTOLIC BLOOD PRESSURE: 102 MMHG | OXYGEN SATURATION: 98 % | WEIGHT: 192 LBS | BODY MASS INDEX: 35.33 KG/M2

## 2022-10-06 PROCEDURE — 99215 OFFICE O/P EST HI 40 MIN: CPT

## 2022-10-15 NOTE — HISTORY OF PRESENT ILLNESS
[de-identified] : YOANNA MCKEON  is a pleasant 31 year year old woman  who came in 08/30/2022 for incidental finding of CIS in GB polyp after cholecystectomy 7/23/2022.\par \par path report showed carcinoma in-situ, disruption of GB at end with potential spillage of biliary content\par \par 9/15/2022 telehealth phone call, discussed with her GI tumor board discussion and input, will plan for diagnostic laparoscopy, peritoneal lavage and portal lymph node sampling after pan CT, will see her to discuss after CAP CT\par \par 9/26/2022 CAP CT no evidence of disease\par \par 10/6/2022 reported no new symptoms

## 2022-10-15 NOTE — ASSESSMENT
[FreeTextEntry1] : YOANNA MCKEON  is a pleasant 31 year year old woman  who came in 08/30/2022 for incidental finding of CIS in GB polyp after cholecystectomy 7/23/2022.\par \par path report showed carcinoma in-situ, disruption of GB at end with potential spillage of biliary content\par \par 8/30/2022 will discuss at GI tumor board, discussed if spillage of cancer cells to peritoneal cavity then no benefit of further surgical resection, if no spillage then no need for futher rx, will cont observation with repeated imaging, no need for diagnostic laparoscopy\par \par 9/15/2022 telehealth phone call, discussed with her GI tumor board discussion and input, will plan for diagnostic laparoscopy, peritoneal lavage and portal lymph node sampling after pan CT, will see her to discuss after CAP CT\par the above plan of care with discussed in details to the patient and all questions were answered to patient satisfaction. patient instructed to follow up with the referring physician and patient primary care provider\par \par 9/26/2022 CAP CT no evidence of disease\par \par 10/6/2022 reported no new symptoms, discussed various treatment options including observation with serial imaging, diagnostic laparoscopy with possible peritoneal bx/lavage/LN, completion to radical cholecystectomy by removing liver bed and portal lymphadenectomy\par \par given her GB carcinoma in-situ and peritoneal spillage of bile after iatrogenic perf GB, we decided to do diagnostic laparoscopy

## 2022-10-17 ENCOUNTER — NON-APPOINTMENT (OUTPATIENT)
Age: 32
End: 2022-10-17

## 2022-10-26 NOTE — ED ADULT NURSE NOTE - BREATH SOUNDS, MLM
0800 am:  f/U on Mondays new weekly inotrope nurse lab phone reminder regarding outside labs.   On 10/25 am received CBC results and were stable:  WBC  13.2  H&H: 8.6/26.4    Platelets: 510    4:20 pm: received CMP , and CK results  NA/K:  132/3.6    Cl/Co2: 89/29    Bun/Cr;  52/2.09  T.Bili:  0.6    CK:  50     Note: cr improved    Copy of all lab results provided to LLOYD LUO, BONITA Rhoades MA this am for Dr. Henry to have at pts 3:00 pm appt today.     I had spoken with dtr on Monday and she told me pt will be at this appt.    Clear

## 2022-11-03 ENCOUNTER — OUTPATIENT (OUTPATIENT)
Dept: OUTPATIENT SERVICES | Facility: HOSPITAL | Age: 32
LOS: 1 days | Discharge: HOME | End: 2022-11-03

## 2022-11-03 VITALS
RESPIRATION RATE: 16 BRPM | TEMPERATURE: 97 F | WEIGHT: 192.02 LBS | SYSTOLIC BLOOD PRESSURE: 110 MMHG | OXYGEN SATURATION: 98 % | HEART RATE: 76 BPM | HEIGHT: 65 IN | DIASTOLIC BLOOD PRESSURE: 64 MMHG

## 2022-11-03 DIAGNOSIS — Z90.49 ACQUIRED ABSENCE OF OTHER SPECIFIED PARTS OF DIGESTIVE TRACT: Chronic | ICD-10-CM

## 2022-11-03 DIAGNOSIS — Z01.818 ENCOUNTER FOR OTHER PREPROCEDURAL EXAMINATION: ICD-10-CM

## 2022-11-03 DIAGNOSIS — C23 MALIGNANT NEOPLASM OF GALLBLADDER: ICD-10-CM

## 2022-11-03 LAB
ALBUMIN SERPL ELPH-MCNC: 4.5 G/DL — SIGNIFICANT CHANGE UP (ref 3.5–5.2)
ALP SERPL-CCNC: 53 U/L — SIGNIFICANT CHANGE UP (ref 30–115)
ALT FLD-CCNC: 46 U/L — HIGH (ref 0–41)
ANION GAP SERPL CALC-SCNC: 12 MMOL/L — SIGNIFICANT CHANGE UP (ref 7–14)
APTT BLD: 32.2 SEC — SIGNIFICANT CHANGE UP (ref 27–39.2)
AST SERPL-CCNC: 23 U/L — SIGNIFICANT CHANGE UP (ref 0–41)
BASOPHILS # BLD AUTO: 0.03 K/UL — SIGNIFICANT CHANGE UP (ref 0–0.2)
BASOPHILS NFR BLD AUTO: 0.4 % — SIGNIFICANT CHANGE UP (ref 0–1)
BILIRUB SERPL-MCNC: <0.2 MG/DL — SIGNIFICANT CHANGE UP (ref 0.2–1.2)
BLD GP AB SCN SERPL QL: SIGNIFICANT CHANGE UP
BUN SERPL-MCNC: 10 MG/DL — SIGNIFICANT CHANGE UP (ref 10–20)
CALCIUM SERPL-MCNC: 9.2 MG/DL — SIGNIFICANT CHANGE UP (ref 8.4–10.5)
CHLORIDE SERPL-SCNC: 102 MMOL/L — SIGNIFICANT CHANGE UP (ref 98–110)
CO2 SERPL-SCNC: 23 MMOL/L — SIGNIFICANT CHANGE UP (ref 17–32)
CREAT SERPL-MCNC: 0.6 MG/DL — LOW (ref 0.7–1.5)
EGFR: 121 ML/MIN/1.73M2 — SIGNIFICANT CHANGE UP
EOSINOPHIL # BLD AUTO: 0.07 K/UL — SIGNIFICANT CHANGE UP (ref 0–0.7)
EOSINOPHIL NFR BLD AUTO: 0.9 % — SIGNIFICANT CHANGE UP (ref 0–8)
GLUCOSE SERPL-MCNC: 102 MG/DL — HIGH (ref 70–99)
HCT VFR BLD CALC: 39.5 % — SIGNIFICANT CHANGE UP (ref 37–47)
HGB BLD-MCNC: 13.4 G/DL — SIGNIFICANT CHANGE UP (ref 12–16)
IMM GRANULOCYTES NFR BLD AUTO: 0.3 % — SIGNIFICANT CHANGE UP (ref 0.1–0.3)
INR BLD: 0.89 RATIO — SIGNIFICANT CHANGE UP (ref 0.65–1.3)
LYMPHOCYTES # BLD AUTO: 2.77 K/UL — SIGNIFICANT CHANGE UP (ref 1.2–3.4)
LYMPHOCYTES # BLD AUTO: 36.2 % — SIGNIFICANT CHANGE UP (ref 20.5–51.1)
MCHC RBC-ENTMCNC: 31.6 PG — HIGH (ref 27–31)
MCHC RBC-ENTMCNC: 33.9 G/DL — SIGNIFICANT CHANGE UP (ref 32–37)
MCV RBC AUTO: 93.2 FL — SIGNIFICANT CHANGE UP (ref 81–99)
MONOCYTES # BLD AUTO: 0.43 K/UL — SIGNIFICANT CHANGE UP (ref 0.1–0.6)
MONOCYTES NFR BLD AUTO: 5.6 % — SIGNIFICANT CHANGE UP (ref 1.7–9.3)
NEUTROPHILS # BLD AUTO: 4.34 K/UL — SIGNIFICANT CHANGE UP (ref 1.4–6.5)
NEUTROPHILS NFR BLD AUTO: 56.6 % — SIGNIFICANT CHANGE UP (ref 42.2–75.2)
NRBC # BLD: 0 /100 WBCS — SIGNIFICANT CHANGE UP (ref 0–0)
PLATELET # BLD AUTO: 376 K/UL — SIGNIFICANT CHANGE UP (ref 130–400)
POTASSIUM SERPL-MCNC: 4.1 MMOL/L — SIGNIFICANT CHANGE UP (ref 3.5–5)
POTASSIUM SERPL-SCNC: 4.1 MMOL/L — SIGNIFICANT CHANGE UP (ref 3.5–5)
PROT SERPL-MCNC: 7.2 G/DL — SIGNIFICANT CHANGE UP (ref 6–8)
PROTHROM AB SERPL-ACNC: 10.1 SEC — SIGNIFICANT CHANGE UP (ref 9.95–12.87)
RBC # BLD: 4.24 M/UL — SIGNIFICANT CHANGE UP (ref 4.2–5.4)
RBC # FLD: 12.6 % — SIGNIFICANT CHANGE UP (ref 11.5–14.5)
SODIUM SERPL-SCNC: 137 MMOL/L — SIGNIFICANT CHANGE UP (ref 135–146)
WBC # BLD: 7.66 K/UL — SIGNIFICANT CHANGE UP (ref 4.8–10.8)
WBC # FLD AUTO: 7.66 K/UL — SIGNIFICANT CHANGE UP (ref 4.8–10.8)

## 2022-11-03 PROCEDURE — 93010 ELECTROCARDIOGRAM REPORT: CPT

## 2022-11-03 RX ORDER — ERENUMAB-AOOE 70 MG/ML
0 INJECTION SUBCUTANEOUS
Qty: 0 | Refills: 0 | DISCHARGE

## 2022-11-03 NOTE — H&P PST ADULT - HISTORY OF PRESENT ILLNESS
33 Y/O FEMALE PRESENTS TO PAST WITH HX ADENOCARCINOMA OF GB. PT S/P LAP SALVATORE 7/22. PT C/O                PT NOW FOR SCHEDULED PROCEDURE ( LAP, POSS OPEN PERITONEAL LAVAGE, PARTIAL LYMPH NODE BX) . PT DENIES ANY CP SOB PALP COUGH DYSURIA FEVER URI. PT ABLE TO BRADY 2 FOS W/O SOB  pt denies any covid s/s, or tested positive in the past  pt advised self quarantine till day of procedure  Anesthesia Alert  NO--Difficult Airway  NO--History of neck surgery or radiation  NO--Limited ROM of neck  NO--History of Malignant hyperthermia  NO--Personal or family history of Pseudocholinesterase deficiency.  NO--Prior Anesthesia Complication  NO--Latex Allergy  NO--Loose teeth  NO--History of Rheumatoid Arthritis  NO--SUSANA  NO--Bleeding risk  NO--Other_____     31 Y/O FEMALE PRESENTS TO PAST WITH HX ADENOCARCINOMA OF GB. PT S/P LAP SALVATORE 7/22. PT HAD ABD PAIN PRIOR TO SALVATORE PT NOW FOR SCHEDULED PROCEDURE ( LAP, POSS OPEN PERITONEAL LAVAGE, PARTIAL LYMPH NODE BX) . PT DENIES ANY CP SOB PALP COUGH DYSURIA FEVER URI. PT ABLE TO BRADY 2 FOS W/O SOB  pt denies any covid s/s, COVID + 3/2020  pt advised self quarantine till day of procedure  Anesthesia Alert  NO--Difficult Airway  NO--History of neck surgery or radiation  NO--Limited ROM of neck  NO--History of Malignant hyperthermia  NO--Personal or family history of Pseudocholinesterase deficiency.  NO--Prior Anesthesia Complication  NO--Latex Allergy  NO--Loose teeth  NO--History of Rheumatoid Arthritis  NO--SUSANA  NO--Bleeding risk  NO--Other_____

## 2022-11-03 NOTE — H&P PST ADULT - NSICDXPASTSURGICALHX_GEN_ALL_CORE_FT
PAST SURGICAL HISTORY:  S/P appendectomy      PAST SURGICAL HISTORY:  History of cholecystectomy 7/23/22    S/P appendectomy

## 2022-11-20 ENCOUNTER — LABORATORY RESULT (OUTPATIENT)
Age: 32
End: 2022-11-20

## 2022-11-22 ENCOUNTER — RESULT REVIEW (OUTPATIENT)
Age: 32
End: 2022-11-22

## 2022-11-22 ENCOUNTER — APPOINTMENT (OUTPATIENT)
Dept: PULMONOLOGY | Facility: CLINIC | Age: 32
End: 2022-11-22

## 2022-11-22 VITALS
HEIGHT: 62 IN | HEART RATE: 95 BPM | SYSTOLIC BLOOD PRESSURE: 122 MMHG | WEIGHT: 195 LBS | DIASTOLIC BLOOD PRESSURE: 80 MMHG | BODY MASS INDEX: 35.88 KG/M2 | OXYGEN SATURATION: 100 %

## 2022-11-22 DIAGNOSIS — E66.9 OBESITY, UNSPECIFIED: ICD-10-CM

## 2022-11-22 DIAGNOSIS — G47.33 OBSTRUCTIVE SLEEP APNEA (ADULT) (PEDIATRIC): ICD-10-CM

## 2022-11-22 PROCEDURE — 99203 OFFICE O/P NEW LOW 30 MIN: CPT

## 2022-11-22 NOTE — REASON FOR VISIT
[Initial] : an initial visit [Sleep Evaluation] : sleep evaluation [[id number]] : The patient presents for the evaluation of sleep quality.

## 2022-11-22 NOTE — ASSESSMENT
[FreeTextEntry1] : Assessment: \par There is a high clinical suspicion for SUSANA,  the patient has classic signs of obstructive sleep apnea.\par Obesity\par \par Plan:\par I will order attended split night sleep testing and I will see the patient  in F/U to arrange for therapies as needed.\par Weight loss was discussed with the patient. \par The patient was counseled against driving in a sleepy state\par

## 2022-11-23 ENCOUNTER — RESULT REVIEW (OUTPATIENT)
Age: 32
End: 2022-11-23

## 2022-11-23 ENCOUNTER — TRANSCRIPTION ENCOUNTER (OUTPATIENT)
Age: 32
End: 2022-11-23

## 2022-11-23 ENCOUNTER — OUTPATIENT (OUTPATIENT)
Dept: OUTPATIENT SERVICES | Facility: HOSPITAL | Age: 32
LOS: 1 days | Discharge: HOME | End: 2022-11-23

## 2022-11-23 ENCOUNTER — APPOINTMENT (OUTPATIENT)
Dept: SURGERY | Facility: HOSPITAL | Age: 32
End: 2022-11-23

## 2022-11-23 VITALS
TEMPERATURE: 97 F | SYSTOLIC BLOOD PRESSURE: 96 MMHG | DIASTOLIC BLOOD PRESSURE: 58 MMHG | RESPIRATION RATE: 18 BRPM | HEART RATE: 69 BPM | OXYGEN SATURATION: 96 %

## 2022-11-23 VITALS
OXYGEN SATURATION: 100 % | RESPIRATION RATE: 18 BRPM | HEART RATE: 65 BPM | SYSTOLIC BLOOD PRESSURE: 108 MMHG | DIASTOLIC BLOOD PRESSURE: 66 MMHG | TEMPERATURE: 97 F

## 2022-11-23 DIAGNOSIS — Z90.49 ACQUIRED ABSENCE OF OTHER SPECIFIED PARTS OF DIGESTIVE TRACT: Chronic | ICD-10-CM

## 2022-11-23 LAB — ABO RH CONFIRMATION: SIGNIFICANT CHANGE UP

## 2022-11-23 PROCEDURE — 88112 CYTOPATH CELL ENHANCE TECH: CPT | Mod: 26

## 2022-11-23 PROCEDURE — 88307 TISSUE EXAM BY PATHOLOGIST: CPT | Mod: 26

## 2022-11-23 PROCEDURE — 47379 UNLISTED LAPS PX LIVER: CPT

## 2022-11-23 PROCEDURE — 88305 TISSUE EXAM BY PATHOLOGIST: CPT | Mod: 26

## 2022-11-23 PROCEDURE — 49329 UNLSTD LAPS PX ABD PERTM&OMN: CPT

## 2022-11-23 RX ORDER — ONDANSETRON 8 MG/1
4 TABLET, FILM COATED ORAL ONCE
Refills: 0 | Status: DISCONTINUED | OUTPATIENT
Start: 2022-11-23 | End: 2022-11-23

## 2022-11-23 RX ORDER — HYDROMORPHONE HYDROCHLORIDE 2 MG/ML
1 INJECTION INTRAMUSCULAR; INTRAVENOUS; SUBCUTANEOUS
Refills: 0 | Status: DISCONTINUED | OUTPATIENT
Start: 2022-11-23 | End: 2022-11-23

## 2022-11-23 RX ORDER — ERENUMAB-AOOE 70 MG/ML
140 INJECTION SUBCUTANEOUS
Qty: 0 | Refills: 0 | DISCHARGE

## 2022-11-23 RX ORDER — CETIRIZINE HYDROCHLORIDE 10 MG/1
1 TABLET ORAL
Qty: 0 | Refills: 0 | DISCHARGE

## 2022-11-23 RX ORDER — SODIUM CHLORIDE 9 MG/ML
1000 INJECTION, SOLUTION INTRAVENOUS
Refills: 0 | Status: DISCONTINUED | OUTPATIENT
Start: 2022-11-23 | End: 2022-11-23

## 2022-11-23 RX ORDER — NORETHINDRONE 0.35 MG/1
1 TABLET ORAL
Qty: 0 | Refills: 0 | DISCHARGE

## 2022-11-23 RX ORDER — HYDROMORPHONE HYDROCHLORIDE 2 MG/ML
0.5 INJECTION INTRAMUSCULAR; INTRAVENOUS; SUBCUTANEOUS
Refills: 0 | Status: DISCONTINUED | OUTPATIENT
Start: 2022-11-23 | End: 2022-11-23

## 2022-11-23 RX ORDER — FENOFIBRATE,MICRONIZED 130 MG
1 CAPSULE ORAL
Qty: 0 | Refills: 0 | DISCHARGE

## 2022-11-23 RX ADMIN — HYDROMORPHONE HYDROCHLORIDE 0.5 MILLIGRAM(S): 2 INJECTION INTRAMUSCULAR; INTRAVENOUS; SUBCUTANEOUS at 10:25

## 2022-11-23 RX ADMIN — HYDROMORPHONE HYDROCHLORIDE 0.5 MILLIGRAM(S): 2 INJECTION INTRAMUSCULAR; INTRAVENOUS; SUBCUTANEOUS at 10:56

## 2022-11-23 NOTE — ASU DISCHARGE PLAN (ADULT/PEDIATRIC) - NS MD DC FALL RISK RISK
For information on Fall & Injury Prevention, visit: [email or web address] OR  [email or web address] OR  https://www.cdc.gov/steadi/patient.html

## 2022-11-23 NOTE — BRIEF OPERATIVE NOTE - OPERATION/FINDINGS
Diagnostic laparoscopy with findings of no carcinomatosis or obvious peritoneal implants. Peritoneal lavage performed of bilateral upper quadrants. Liver wedge biopsy of gallbladder fossa and peritoneal nodule (likely fat) sent for pathology.

## 2022-11-23 NOTE — ASU DISCHARGE PLAN (ADULT/PEDIATRIC) - ASU DC SPECIAL INSTRUCTIONSFT
Diet: Continue your regular diet.  Dressings: You have surgical glue over your incisions. They are waterproof; you may shower normally. Do not pick them off, they will fall off by themselves.   Pain: Take Tylenol 650mg every 6-8 hours as needed for pain. Do not take NSAIDs (motrin, aleve, etc.) until cleared by Dr. Bradshaw.  Activity: Avoid heavy lifting (anything over 10 pounds) for at least 4 weeks.   Follow up: Call to schedule a follow up appointment in 2 weeks.

## 2022-11-23 NOTE — ASU DISCHARGE PLAN (ADULT/PEDIATRIC) - CARE PROVIDER_API CALL
Macy Bradshaw (MD)  Complex General Surgical Oncology; Surgery  [address], [address] [place] [address]  Phone: [phone]  Fax: [phone]  Follow Up Time: 2 weeks

## 2022-11-23 NOTE — BRIEF OPERATIVE NOTE - NSICDXBRIEFPROCEDURE_GEN_ALL_CORE_FT
PROCEDURES:  Diagnostic laparoscopy 23-Nov-2022 09:36:27  Merlyn Qureshi  Laparoscopic peritoneal lavage 23-Nov-2022 09:36:36  Merlyn Qureshi  Laparoscopic liver biopsy 23-Nov-2022 09:36:55  Merlyn Qureshi

## 2022-11-29 LAB — SURGICAL PATHOLOGY STUDY: SIGNIFICANT CHANGE UP

## 2022-11-30 DIAGNOSIS — E78.5 HYPERLIPIDEMIA, UNSPECIFIED: ICD-10-CM

## 2022-11-30 DIAGNOSIS — Z82.49 FAMILY HISTORY OF ISCHEMIC HEART DISEASE AND OTHER DISEASES OF THE CIRCULATORY SYSTEM: ICD-10-CM

## 2022-11-30 DIAGNOSIS — E66.9 OBESITY, UNSPECIFIED: ICD-10-CM

## 2022-11-30 DIAGNOSIS — K74.00 HEPATIC FIBROSIS, UNSPECIFIED: ICD-10-CM

## 2022-11-30 DIAGNOSIS — Z87.891 PERSONAL HISTORY OF NICOTINE DEPENDENCE: ICD-10-CM

## 2022-11-30 DIAGNOSIS — Z83.3 FAMILY HISTORY OF DIABETES MELLITUS: ICD-10-CM

## 2022-11-30 DIAGNOSIS — D01.5 CARCINOMA IN SITU OF LIVER, GALLBLADDER AND BILE DUCTS: ICD-10-CM

## 2022-11-30 DIAGNOSIS — K65.4 SCLEROSING MESENTERITIS: ICD-10-CM

## 2022-11-30 DIAGNOSIS — Z85.09 PERSONAL HISTORY OF MALIGNANT NEOPLASM OF OTHER DIGESTIVE ORGANS: ICD-10-CM

## 2022-11-30 DIAGNOSIS — E28.2 POLYCYSTIC OVARIAN SYNDROME: ICD-10-CM

## 2022-12-01 LAB — NON-GYNECOLOGICAL CYTOLOGY STUDY: SIGNIFICANT CHANGE UP

## 2022-12-06 ENCOUNTER — APPOINTMENT (OUTPATIENT)
Dept: SURGERY | Facility: CLINIC | Age: 32
End: 2022-12-06

## 2022-12-06 VITALS
DIASTOLIC BLOOD PRESSURE: 60 MMHG | HEART RATE: 86 BPM | WEIGHT: 192.25 LBS | OXYGEN SATURATION: 98 % | HEIGHT: 62 IN | SYSTOLIC BLOOD PRESSURE: 100 MMHG | BODY MASS INDEX: 35.38 KG/M2 | TEMPERATURE: 97.8 F

## 2022-12-06 PROCEDURE — 99024 POSTOP FOLLOW-UP VISIT: CPT

## 2022-12-11 NOTE — HISTORY OF PRESENT ILLNESS
[de-identified] : 11/23/22 Diagnostic laparoscopy, laparoscopic peritoneal lavage and laparoscopic liver biopsy [de-identified] : YOANNA MCKEON  is a pleasant 31 year year old woman  who came in 08/30/2022 for incidental finding of CIS in GB polyp after cholecystectomy 7/23/2022.\par \par path report showed carcinoma in-situ, disruption of GB at end with potential spillage of biliary content\par \par 9/15/2022 telehealth phone call, discussed with her GI tumor board discussion and input, will plan for diagnostic laparoscopy, peritoneal lavage and portal lymph node sampling after pan CT, will see her to discuss after CAP CT\par \par 9/26/2022 CAP CT no evidence of disease\par \par 10/6/2022 reported no new symptoms

## 2022-12-11 NOTE — REASON FOR VISIT
[Post Op: _________] : a [unfilled] post op visit [FreeTextEntry1] : 11/23/22 dX lap, laparoscopic peritoneal lavage, laparoscopic liver biopsy [Post-Op] : a post-op for [Referred By: ___] : Referred By: [unfilled] [Parent] : parent [FreeTextEntry2] : Gallbladder CIS

## 2022-12-11 NOTE — PHYSICAL EXAM
[Abdomen Tenderness] : ~T ~M No abdominal tenderness [de-identified] : well dressed [de-identified] : abdomen non tender, non distended, Port sites healing wee. No erythema or drainage

## 2022-12-11 NOTE — ASSESSMENT
[FreeTextEntry1] : IRENE MCKEON  is a pleasant 31 year year old woman  who came in 08/30/2022 for incidental finding of CIS in GB polyp after cholecystectomy 7/23/2022.\par \par path report showed carcinoma in-situ, disruption of GB at end with potential spillage of biliary content\par \par 8/30/2022 will discuss at GI tumor board, discussed if spillage of cancer cells to peritoneal cavity then no benefit of further surgical resection, if no spillage then no need for futher rx, will cont observation with repeated imaging, no need for diagnostic laparoscopy\par \par 9/15/2022 telehealth phone call, discussed with her GI tumor board discussion and input, will plan for diagnostic laparoscopy, peritoneal lavage and portal lymph node sampling after pan CT, will see her to discuss after CAP CT\par the above plan of care with discussed in details to the patient and all questions were answered to patient satisfaction. patient instructed to follow up with the referring physician and patient primary care provider\par \par 9/26/2022 CAP CT no evidence of disease\par \par 10/6/2022 reported no new symptoms, discussed various treatment options including observation with serial imaging, diagnostic laparoscopy with possible peritoneal bx/lavage/LN, completion to radical cholecystectomy by removing liver bed and portal lymphadenectomy\par \par given her GB carcinoma in-situ and peritoneal spillage of bile after iatrogenic perf GB, we decided to do diagnostic laparoscopy \par 12/6/22 Irene is here with her mother for a post operative visit. November 23, 2022 Irene underwent a DX lap, laparoscopic peritoneal lavage and laparoscopic liver biopsy for history of gallbladder carcinoma in situ. Port sites healing well. No erythema or drainage noted. Irene offers no complaints. Tolerating diet. Reports normal bowel movements. Pathology reviewed with Irene and her mother. Copy of report given to them. Diet , activity and importance of oncologic surveillance discussed. Plan to follow in this office in three months time with new ct scan chest, abdomen and pelvis. All question answered. Encouraged to call office with any questions or concerns.

## 2023-02-27 ENCOUNTER — NON-APPOINTMENT (OUTPATIENT)
Age: 33
End: 2023-02-27

## 2023-03-10 ENCOUNTER — OUTPATIENT (OUTPATIENT)
Dept: OUTPATIENT SERVICES | Facility: HOSPITAL | Age: 33
LOS: 1 days | End: 2023-03-10
Payer: COMMERCIAL

## 2023-03-10 ENCOUNTER — RESULT REVIEW (OUTPATIENT)
Age: 33
End: 2023-03-10

## 2023-03-10 DIAGNOSIS — R10.9 UNSPECIFIED ABDOMINAL PAIN: ICD-10-CM

## 2023-03-10 DIAGNOSIS — D09.9 CARCINOMA IN SITU, UNSPECIFIED: ICD-10-CM

## 2023-03-10 DIAGNOSIS — Z00.8 ENCOUNTER FOR OTHER GENERAL EXAMINATION: ICD-10-CM

## 2023-03-10 DIAGNOSIS — Z90.49 ACQUIRED ABSENCE OF OTHER SPECIFIED PARTS OF DIGESTIVE TRACT: Chronic | ICD-10-CM

## 2023-03-10 PROCEDURE — 71260 CT THORAX DX C+: CPT | Mod: 26

## 2023-03-10 PROCEDURE — 74177 CT ABD & PELVIS W/CONTRAST: CPT | Mod: 26

## 2023-03-10 PROCEDURE — 74177 CT ABD & PELVIS W/CONTRAST: CPT

## 2023-03-10 PROCEDURE — 71260 CT THORAX DX C+: CPT

## 2023-03-11 DIAGNOSIS — R10.9 UNSPECIFIED ABDOMINAL PAIN: ICD-10-CM

## 2023-03-11 DIAGNOSIS — D09.9 CARCINOMA IN SITU, UNSPECIFIED: ICD-10-CM

## 2023-03-14 ENCOUNTER — APPOINTMENT (OUTPATIENT)
Dept: SURGERY | Facility: CLINIC | Age: 33
End: 2023-03-14
Payer: COMMERCIAL

## 2023-03-14 VITALS
HEART RATE: 97 BPM | HEIGHT: 62 IN | SYSTOLIC BLOOD PRESSURE: 112 MMHG | OXYGEN SATURATION: 97 % | BODY MASS INDEX: 35.33 KG/M2 | DIASTOLIC BLOOD PRESSURE: 78 MMHG | WEIGHT: 192 LBS | TEMPERATURE: 97.1 F

## 2023-03-14 PROCEDURE — 99214 OFFICE O/P EST MOD 30 MIN: CPT

## 2023-03-20 NOTE — HISTORY OF PRESENT ILLNESS
[de-identified] : YOANNA MCKEON  is a pleasant 31 year year old woman  who came in 08/30/2022 for incidental finding of CIS in GB polyp after cholecystectomy 7/23/2022.\par \par path report showed carcinoma in-situ, disruption of GB at end with potential spillage of biliary content\par \par 9/15/2022 telehealth phone call, discussed with her GI tumor board discussion and input, will plan for diagnostic laparoscopy, peritoneal lavage and portal lymph node sampling after pan CT, will see her to discuss after CAP CT\par \par 9/26/2022 CAP CT no evidence of disease\par \par 10/6/2022 reported no new symptoms\par \par 3/14/2023: reported no symptoms, march 10, 2023 CT CAP showed HONORIO but jeremiah ovarian cyst

## 2023-03-20 NOTE — ASSESSMENT
[FreeTextEntry1] : YOANNA MCKEON  is a pleasant 31 year year old woman  who came in 08/30/2022 for incidental finding of CIS in GB polyp after cholecystectomy 7/23/2022.\par \par path report showed carcinoma in-situ, disruption of GB at end with potential spillage of biliary content\par \par 8/30/2022 will discuss at GI tumor board, discussed if spillage of cancer cells to peritoneal cavity then no benefit of further surgical resection, if no spillage then no need for futher rx, will cont observation with repeated imaging, no need for diagnostic laparoscopy\par \par 9/15/2022 telehealth phone call, discussed with her GI tumor board discussion and input, will plan for diagnostic laparoscopy, peritoneal lavage and portal lymph node sampling after pan CT, will see her to discuss after CAP CT\par the above plan of care with discussed in details to the patient and all questions were answered to patient satisfaction. patient instructed to follow up with the referring physician and patient primary care provider\par \par 9/26/2022 CAP CT no evidence of disease\par \par 10/6/2022 reported no new symptoms, discussed various treatment options including observation with serial imaging, diagnostic laparoscopy with possible peritoneal bx/lavage/LN, completion to radical cholecystectomy by removing liver bed and portal lymphadenectomy\par \par given her GB carcinoma in-situ and peritoneal spillage of bile after iatrogenic perf GB, we decided to do diagnostic laparoscopy \par \par 3/14/2023: reported no symptoms, march 10, 2023 CT CAP showed HONORIO but jeremiah ovarian cyst, discussed with her follow up with her GYN for her ovarian cyst, will see her in 3 months with new CT

## 2023-05-02 NOTE — ED ADULT NURSE NOTE - NS_SISCREENINGSR_GEN_ALL_ED
Abdomen , soft, nontender, nondistended , no guarding or rigidity , no masses palpable , normal bowel sounds , Liver and Spleen , no hepatosplenomegaly Negative

## 2023-06-29 ENCOUNTER — NON-APPOINTMENT (OUTPATIENT)
Age: 33
End: 2023-06-29

## 2023-07-25 NOTE — H&P ADULT - REASON FOR ADMISSION
Lisa Flores is a 25 year old female presenting for   Chief Complaint   Patient presents with   • Hives     All over - wednesdsay      Denies Latex allergy or sensitivity.    Medication verified, no changes  Refills needed today: No    Health Maintenance Due   Topic Date Due   • COVID-19 Vaccine (1) Never done   • HPV Vaccine (1 - 2-dose series) Never done       Patient is due for topics as listed above but is not proceeding with Immunization(s) COVID-19 and HPV at this time.          Last lab results:   No results found for: \"HGBA1C\"  No results found for: \"CHOLESTEROL\"  No results found for: \"HDL\"  No results found for: \"TRIGLYCERIDE\"  No results found for: \"CALCLDL\"  No results found for: \"URMIC\", \"UCR\", \"MALBCR\"  No results found for: \"IFOB\"              Depression Screening:  Review Flowsheet  More data exists       7/24/2023   PHQ 2/9 Score   Adult PHQ 2 Score 2   Adult PHQ 2 Interpretation No further screening needed   Little interest or pleasure in activity? Several days   Feeling down, depressed or hopeless? Several days        Advance Directives:  not discussed      Blood pressure 110/78, pulse 89, weight 70.2 kg (154 lb 12.8 oz), last menstrual period 06/24/2023, SpO2 97 %.   Right upper quadrant abdominal pain

## 2023-08-07 ENCOUNTER — RESULT REVIEW (OUTPATIENT)
Age: 33
End: 2023-08-07

## 2023-08-07 ENCOUNTER — OUTPATIENT (OUTPATIENT)
Dept: OUTPATIENT SERVICES | Facility: HOSPITAL | Age: 33
LOS: 1 days | End: 2023-08-07
Payer: COMMERCIAL

## 2023-08-07 DIAGNOSIS — Z00.8 ENCOUNTER FOR OTHER GENERAL EXAMINATION: ICD-10-CM

## 2023-08-07 DIAGNOSIS — R10.2 PELVIC AND PERINEAL PAIN: ICD-10-CM

## 2023-08-07 DIAGNOSIS — Z90.49 ACQUIRED ABSENCE OF OTHER SPECIFIED PARTS OF DIGESTIVE TRACT: Chronic | ICD-10-CM

## 2023-08-07 PROCEDURE — 74177 CT ABD & PELVIS W/CONTRAST: CPT | Mod: 26

## 2023-08-07 PROCEDURE — 71260 CT THORAX DX C+: CPT | Mod: 26

## 2023-08-07 PROCEDURE — 71260 CT THORAX DX C+: CPT

## 2023-08-07 PROCEDURE — 74177 CT ABD & PELVIS W/CONTRAST: CPT

## 2023-08-08 DIAGNOSIS — R10.2 PELVIC AND PERINEAL PAIN: ICD-10-CM

## 2023-08-29 ENCOUNTER — APPOINTMENT (OUTPATIENT)
Dept: SURGERY | Facility: CLINIC | Age: 33
End: 2023-08-29
Payer: COMMERCIAL

## 2023-08-29 VITALS
HEIGHT: 62 IN | SYSTOLIC BLOOD PRESSURE: 114 MMHG | HEART RATE: 95 BPM | BODY MASS INDEX: 36.62 KG/M2 | TEMPERATURE: 96.8 F | WEIGHT: 199 LBS | DIASTOLIC BLOOD PRESSURE: 68 MMHG | OXYGEN SATURATION: 98 %

## 2023-08-29 DIAGNOSIS — Z90.49 ACQUIRED ABSENCE OF OTHER SPECIFIED PARTS OF DIGESTIVE TRACT: ICD-10-CM

## 2023-08-29 PROCEDURE — 99213 OFFICE O/P EST LOW 20 MIN: CPT

## 2023-09-05 NOTE — HISTORY OF PRESENT ILLNESS
[de-identified] : YOANNA MCKEON  is a pleasant 31 year year old woman  who came in 08/30/2022 for incidental finding of CIS in GB polyp after cholecystectomy 7/23/2022.  path report showed carcinoma in-situ, disruption of GB at end with potential spillage of biliary content  9/15/2022 telehealth phone call, discussed with her GI tumor board discussion and input, will plan for diagnostic laparoscopy, peritoneal lavage and portal lymph node sampling after pan CT, will see her to discuss after CAP CT  9/26/2022 CAP CT no evidence of disease  10/6/2022 reported no new symptoms  3/14/2023: reported no symptoms, march 10, 2023 CT CAP showed HONORIO but jeremiah ovarian cyst 8/29/23 follow up for history of gallbaldder cancer in situ

## 2023-09-05 NOTE — REASON FOR VISIT
[Follow-Up: _____] : a [unfilled] follow-up visit [Follow-Up Visit] : a follow-up visit for [Referred By: ___] : Referred By: [unfilled] [FreeTextEntry1] : Gallbladder carcinoma in situ

## 2023-09-05 NOTE — ASSESSMENT
[FreeTextEntry1] : YOANNA MCKEON  is a pleasant 31 year year old woman  who came in 08/30/2022 for incidental finding of CIS in GB polyp after cholecystectomy 7/23/2022.  path report showed carcinoma in-situ, disruption of GB at end with potential spillage of biliary content  8/30/2022 will discuss at GI tumor board, discussed if spillage of cancer cells to peritoneal cavity then no benefit of further surgical resection, if no spillage then no need for futher rx, will cont observation with repeated imaging, no need for diagnostic laparoscopy  9/15/2022 telehealth phone call, discussed with her GI tumor board discussion and input, will plan for diagnostic laparoscopy, peritoneal lavage and portal lymph node sampling after pan CT, will see her to discuss after CAP CT the above plan of care with discussed in details to the patient and all questions were answered to patient satisfaction. patient instructed to follow up with the referring physician and patient primary care provider  9/26/2022 CAP CT no evidence of disease  10/6/2022 reported no new symptoms, discussed various treatment options including observation with serial imaging, diagnostic laparoscopy with possible peritoneal bx/lavage/LN, completion to radical cholecystectomy by removing liver bed and portal lymphadenectomy  given her GB carcinoma in-situ and peritoneal spillage of bile after iatrogenic perf GB, we decided to do diagnostic laparoscopy   3/14/2023: reported no symptoms, march 10, 2023 CT CAP showed HONORIO but jeremiah ovarian cyst, discussed with her follow up with her GYN for her ovarian cyst, will see her in 3 months with new CT  8/29/23 Patient seen and examined with Dr. Bradshaw. Hx of gall bladder carcinoma in situ, Offers no complains. Patient has almost completed nursing school. Reviewed recent imaging with patient. No definitive findings of metastatic disease. Patient is interested in starting a family. Encouraged patient to wait another six months after repeat imaging before starting. Patient agreed with the plan.  the above plan of care with discussed in details to the patient and all questions were answered to patient satisfaction. patient instructed to follow up with the referring physician and patient primary care provider   A total of 25 minutes was spent on this visit, obtaining h/p,  reviewing previous notes/imaging, counseling the patient on f/u , ordering tests (below), and documenting the findings in the note.

## 2024-03-08 ENCOUNTER — NON-APPOINTMENT (OUTPATIENT)
Age: 34
End: 2024-03-08

## 2024-03-14 ENCOUNTER — NON-APPOINTMENT (OUTPATIENT)
Age: 34
End: 2024-03-14

## 2024-04-19 ENCOUNTER — RESULT REVIEW (OUTPATIENT)
Age: 34
End: 2024-04-19

## 2024-04-19 ENCOUNTER — OUTPATIENT (OUTPATIENT)
Dept: OUTPATIENT SERVICES | Facility: HOSPITAL | Age: 34
LOS: 1 days | End: 2024-04-19
Payer: COMMERCIAL

## 2024-04-19 DIAGNOSIS — Z90.49 ACQUIRED ABSENCE OF OTHER SPECIFIED PARTS OF DIGESTIVE TRACT: Chronic | ICD-10-CM

## 2024-04-19 DIAGNOSIS — Z00.8 ENCOUNTER FOR OTHER GENERAL EXAMINATION: ICD-10-CM

## 2024-04-19 DIAGNOSIS — D09.9 CARCINOMA IN SITU, UNSPECIFIED: ICD-10-CM

## 2024-04-19 PROCEDURE — 74177 CT ABD & PELVIS W/CONTRAST: CPT

## 2024-04-19 PROCEDURE — 74177 CT ABD & PELVIS W/CONTRAST: CPT | Mod: 26

## 2024-04-19 PROCEDURE — 71260 CT THORAX DX C+: CPT

## 2024-04-19 PROCEDURE — 71260 CT THORAX DX C+: CPT | Mod: 26

## 2024-04-20 DIAGNOSIS — D09.9 CARCINOMA IN SITU, UNSPECIFIED: ICD-10-CM

## 2024-05-07 ENCOUNTER — APPOINTMENT (OUTPATIENT)
Dept: SURGERY | Facility: CLINIC | Age: 34
End: 2024-05-07
Payer: COMMERCIAL

## 2024-05-07 VITALS
HEIGHT: 62 IN | WEIGHT: 200 LBS | BODY MASS INDEX: 36.8 KG/M2 | OXYGEN SATURATION: 97 % | DIASTOLIC BLOOD PRESSURE: 70 MMHG | SYSTOLIC BLOOD PRESSURE: 118 MMHG | HEART RATE: 90 BPM

## 2024-05-07 DIAGNOSIS — D09.9 CARCINOMA IN SITU, UNSPECIFIED: ICD-10-CM

## 2024-05-07 PROCEDURE — 99215 OFFICE O/P EST HI 40 MIN: CPT

## 2024-05-08 NOTE — PRE-ANESTHESIA EVALUATION ADULT - ANESTHESIA, PREVIOUS REACTION, PROFILE
none
I have personally seen and examined this patient. I fully participated in the care of this patient. I have made amendments to the documentation where appropriate and otherwise agree with the history, physical exam, and plan as documented by the

## 2024-05-13 NOTE — HISTORY OF PRESENT ILLNESS
[de-identified] : YOANNA MCKEON  is a pleasant 31 year year old woman  who came in 08/30/2022 for incidental finding of CIS in GB polyp after cholecystectomy 7/23/2022.\par  \par  path report showed carcinoma in-situ, disruption of GB at end with potential spillage of biliary content\par  \par  9/15/2022 telehealth phone call, discussed with her GI tumor board discussion and input, will plan for diagnostic laparoscopy, peritoneal lavage and portal lymph node sampling after pan CT, will see her to discuss after CAP CT\par  \par  9/26/2022 CAP CT no evidence of disease\par  \par  10/6/2022 reported no new symptoms\par  \par  3/14/2023: reported no symptoms, march 10, 2023 CT CAP showed HONORIO but jeremiah ovarian cyst

## 2024-05-13 NOTE — ASSESSMENT
[FreeTextEntry1] : YOANNA MCKEON  is a pleasant 31 year year old woman  who came in 08/30/2022 for incidental finding of CIS in GB polyp after cholecystectomy 7/23/2022.  path report showed carcinoma in-situ, disruption of GB at end with potential spillage of biliary content  8/30/2022 will discuss at GI tumor board, discussed if spillage of cancer cells to peritoneal cavity then no benefit of further surgical resection, if no spillage then no need for futher rx, will cont observation with repeated imaging, no need for diagnostic laparoscopy  9/15/2022 telehealth phone call, discussed with her GI tumor board discussion and input, will plan for diagnostic laparoscopy, peritoneal lavage and portal lymph node sampling after pan CT, will see her to discuss after CAP CT the above plan of care with discussed in details to the patient and all questions were answered to patient satisfaction. patient instructed to follow up with the referring physician and patient primary care provider  9/26/2022 CAP CT no evidence of disease  10/6/2022 reported no new symptoms, discussed various treatment options including observation with serial imaging, diagnostic laparoscopy with possible peritoneal bx/lavage/LN, completion to radical cholecystectomy by removing liver bed and portal lymphadenectomy  given her GB carcinoma in-situ and peritoneal spillage of bile after iatrogenic perf GB, we decided to do diagnostic laparoscopy   3/14/2023: reported no symptoms, march 10, 2023 CT CAP showed HONORIO but jeremiah ovarian cyst, discussed with her follow up with her GYN for her ovarian cyst, will see her in 3 months with new CT  05/07/2024 : no new reported symptoms, exam is unchanged, CT CAP 4/19/2024 HONORIO, she wants to get pregnant, we discussed pros and cons and agreed to hold for imaging until she get pregnant and give delivery.  the above plan of care with discussed in details to the patient and all questions were answered to patient satisfaction. patient instructed to follow up with the referring physician and patient primary care provider   A total of 45 minutes was spent on this visit, obtaining h/p,  reviewing previous notes/imaging, counseling the patient on f/u , ordering tests (below), and documenting the findings in the note.

## 2024-07-10 NOTE — BRIEF OPERATIVE NOTE - NSICDXBRIEFPOSTOP_GEN_ALL_CORE_FT
Problem: Pain  Goal: Verbalizes/displays adequate comfort level or baseline comfort level  Outcome: Progressing     Problem: Cognitive:  Goal: Knowledge of wound care  Description: Knowledge of wound care  7/10/2024 0819 by Trish Sharma RN  Outcome: Progressing  7/10/2024 0818 by Trish Sharma RN  Outcome: Progressing  Goal: Understands risk factors for wounds  Description: Understands risk factors for wounds  7/10/2024 0819 by Trish Sharma RN  Outcome: Progressing  7/10/2024 0818 by Trish Sharma RN  Outcome: Progressing     Problem: Wound:  Goal: Will show signs of wound healing; wound closure and no evidence of infection  Description: Will show signs of wound healing; wound closure and no evidence of infection  7/10/2024 0819 by Trish Sharma RN  Outcome: Progressing  7/10/2024 0818 by Trish Sharma RN  Outcome: Progressing       Problem: Venous:  Goal: Signs of wound healing will improve  Description: Signs of wound healing will improve  7/10/2024 0819 by Trish Sharma RN  Outcome: Progressing  7/10/2024 0818 by Trish Sharma RN  Outcome: Progressing     Problem: Smoking cessation:  Goal: Ability to formulate a plan to maintain a tobacco-free life will be supported  Description: Ability to formulate a plan to maintain a tobacco-free life will be supported  7/10/2024 0819 by Trish Sharma RN  Outcome: Progressing  7/10/2024 0818 by Trish Sharma RN  Outcome: Progressing      POST-OP DIAGNOSIS:  Cholelithiasis with cholecystitis 23-Jul-2022 12:37:04  Merlyn Qureshi

## 2025-04-05 DIAGNOSIS — Z32.01 ENCOUNTER FOR PREGNANCY TEST, RESULT POSITIVE: ICD-10-CM

## 2025-04-05 DIAGNOSIS — Z00.00 ENCOUNTER FOR GENERAL ADULT MEDICAL EXAMINATION W/OUT ABNORMAL FINDINGS: ICD-10-CM

## 2025-04-09 ENCOUNTER — APPOINTMENT (OUTPATIENT)
Dept: OBGYN | Facility: CLINIC | Age: 35
End: 2025-04-09

## 2025-05-04 ENCOUNTER — EMERGENCY (EMERGENCY)
Facility: HOSPITAL | Age: 35
LOS: 0 days | Discharge: LEFT BEFORE DISCHARGE | End: 2025-05-04

## 2025-05-04 ENCOUNTER — OUTPATIENT (OUTPATIENT)
Dept: INPATIENT UNIT | Facility: HOSPITAL | Age: 35
LOS: 1 days | Discharge: HOME | End: 2025-05-04
Payer: COMMERCIAL

## 2025-05-04 VITALS
TEMPERATURE: 98 F | DIASTOLIC BLOOD PRESSURE: 84 MMHG | HEART RATE: 95 BPM | SYSTOLIC BLOOD PRESSURE: 121 MMHG | RESPIRATION RATE: 18 BRPM | WEIGHT: 197.98 LBS | OXYGEN SATURATION: 100 %

## 2025-05-04 VITALS — HEART RATE: 93 BPM | SYSTOLIC BLOOD PRESSURE: 112 MMHG | DIASTOLIC BLOOD PRESSURE: 64 MMHG

## 2025-05-04 VITALS — DIASTOLIC BLOOD PRESSURE: 58 MMHG | HEART RATE: 99 BPM | SYSTOLIC BLOOD PRESSURE: 112 MMHG

## 2025-05-04 DIAGNOSIS — Z53.21 PROCEDURE AND TREATMENT NOT CARRIED OUT DUE TO PATIENT LEAVING PRIOR TO BEING SEEN BY HEALTH CARE PROVIDER: ICD-10-CM

## 2025-05-04 DIAGNOSIS — O26.852 SPOTTING COMPLICATING PREGNANCY, SECOND TRIMESTER: ICD-10-CM

## 2025-05-04 DIAGNOSIS — Z90.49 ACQUIRED ABSENCE OF OTHER SPECIFIED PARTS OF DIGESTIVE TRACT: Chronic | ICD-10-CM

## 2025-05-04 DIAGNOSIS — Z3A.21 21 WEEKS GESTATION OF PREGNANCY: ICD-10-CM

## 2025-05-04 DIAGNOSIS — O26.899 OTHER SPECIFIED PREGNANCY RELATED CONDITIONS, UNSPECIFIED TRIMESTER: ICD-10-CM

## 2025-05-04 LAB
APPEARANCE UR: CLEAR — SIGNIFICANT CHANGE UP
BILIRUB UR-MCNC: NEGATIVE — SIGNIFICANT CHANGE UP
COLOR SPEC: YELLOW — SIGNIFICANT CHANGE UP
DIFF PNL FLD: ABNORMAL
GLUCOSE UR QL: NEGATIVE MG/DL — SIGNIFICANT CHANGE UP
KETONES UR-MCNC: 15 MG/DL
LEUKOCYTE ESTERASE UR-ACNC: NEGATIVE — SIGNIFICANT CHANGE UP
NITRITE UR-MCNC: NEGATIVE — SIGNIFICANT CHANGE UP
PH UR: 6 — SIGNIFICANT CHANGE UP (ref 5–8)
PROT UR-MCNC: NEGATIVE MG/DL — SIGNIFICANT CHANGE UP
SP GR SPEC: 1.02 — SIGNIFICANT CHANGE UP (ref 1–1.03)
UROBILINOGEN FLD QL: 0.2 MG/DL — SIGNIFICANT CHANGE UP (ref 0.2–1)

## 2025-05-04 PROCEDURE — 87186 SC STD MICRODIL/AGAR DIL: CPT

## 2025-05-04 PROCEDURE — 87086 URINE CULTURE/COLONY COUNT: CPT

## 2025-05-04 PROCEDURE — L9996: CPT

## 2025-05-04 PROCEDURE — 81001 URINALYSIS AUTO W/SCOPE: CPT

## 2025-05-04 PROCEDURE — 99214 OFFICE O/P EST MOD 30 MIN: CPT

## 2025-05-04 NOTE — OB PROVIDER TRIAGE NOTE - HISTORY OF PRESENT ILLNESS
33yo , at 20w5d, ALISIA 25, dated by LMP, presents for blood with wiping at 1700. Pt reports she has not required a pad. Denies lof, ctx, abdominal pain, hematuria, dysuria, back pain and abnormal vaginal discharge. Pt has not felt fetal movement. Reports an uncomplicated pregnancy thus far. GBS unknown.

## 2025-05-04 NOTE — OB RN TRIAGE NOTE - FALL HARM RISK - RISK INTERVENTIONS

## 2025-05-04 NOTE — OB PROVIDER TRIAGE NOTE - NSHPPHYSICALEXAM_GEN_ALL_CORE
Physical Exam  Vital Signs Last 24 Hrs  T(F): 98 (04 May 2025 19:57), Max: 98 (04 May 2025 19:57)  HR: 99 (04 May 2025 20:58) (93 - 99)  BP: 112/58 (04 May 2025 20:58) (112/58 - 121/84)  RR: 18 (04 May 2025 19:57) (18 - 18)    Gen: NAD, AOx3  Abdomen: soft, gravid, nontender, no palpable contractions    [place]: not stephan  SVE: 0/0/-3  Spec: no blood, cervix appears closed, small area of blood noted at level of urethra, no excoriations or lesions otherwise     BSS: vertex, MVP 6.3cm, gross fetal movement

## 2025-05-04 NOTE — OB PROVIDER TRIAGE NOTE - NSOBPROVIDERNOTE_OBGYN_ALL_OB_FT
34y  @ 20w5d, with no vaginal bleeding, likely not in PTL.    -f/u ua, ucx  -Discharged Home  -Labor precautions reviewed  -PO Hydration encouraged  -Reviewed fetal kick counts  -Follow up with scheduled OB visit  -Patient verbalized understanding    Discussed with Dr. Huynh and Dr. Heller

## 2025-05-06 ENCOUNTER — APPOINTMENT (OUTPATIENT)
Dept: SURGERY | Facility: CLINIC | Age: 35
End: 2025-05-06

## 2025-05-07 DIAGNOSIS — O99.282 ENDOCRINE, NUTRITIONAL AND METABOLIC DISEASES COMPLICATING PREGNANCY, SECOND TRIMESTER: ICD-10-CM

## 2025-05-07 DIAGNOSIS — Z3A.20 20 WEEKS GESTATION OF PREGNANCY: ICD-10-CM

## 2025-05-07 DIAGNOSIS — O26.852 SPOTTING COMPLICATING PREGNANCY, SECOND TRIMESTER: ICD-10-CM

## 2025-05-07 DIAGNOSIS — G43.909 MIGRAINE, UNSPECIFIED, NOT INTRACTABLE, WITHOUT STATUS MIGRAINOSUS: ICD-10-CM

## 2025-05-07 DIAGNOSIS — O09.512 SUPERVISION OF ELDERLY PRIMIGRAVIDA, SECOND TRIMESTER: ICD-10-CM

## 2025-05-07 DIAGNOSIS — O99.352 DISEASES OF THE NERVOUS SYSTEM COMPLICATING PREGNANCY, SECOND TRIMESTER: ICD-10-CM

## 2025-05-07 DIAGNOSIS — E78.5 HYPERLIPIDEMIA, UNSPECIFIED: ICD-10-CM

## 2025-05-07 DIAGNOSIS — O99.212 OBESITY COMPLICATING PREGNANCY, SECOND TRIMESTER: ICD-10-CM

## 2025-05-07 DIAGNOSIS — E66.9 OBESITY, UNSPECIFIED: ICD-10-CM

## 2025-05-07 LAB
-  AMOXICILLIN/CLAVULANIC ACID: SIGNIFICANT CHANGE UP
-  AMPICILLIN/SULBACTAM: SIGNIFICANT CHANGE UP
-  AMPICILLIN: SIGNIFICANT CHANGE UP
-  AZTREONAM: SIGNIFICANT CHANGE UP
-  CEFAZOLIN: SIGNIFICANT CHANGE UP
-  CEFEPIME: SIGNIFICANT CHANGE UP
-  CEFOXITIN: SIGNIFICANT CHANGE UP
-  CEFTRIAXONE: SIGNIFICANT CHANGE UP
-  CEFUROXIME: SIGNIFICANT CHANGE UP
-  CIPROFLOXACIN: SIGNIFICANT CHANGE UP
-  ERTAPENEM: SIGNIFICANT CHANGE UP
-  GENTAMICIN: SIGNIFICANT CHANGE UP
-  IMIPENEM: SIGNIFICANT CHANGE UP
-  LEVOFLOXACIN: SIGNIFICANT CHANGE UP
-  MEROPENEM: SIGNIFICANT CHANGE UP
-  NITROFURANTOIN: SIGNIFICANT CHANGE UP
-  PIPERACILLIN/TAZOBACTAM: SIGNIFICANT CHANGE UP
-  TIGECYCLINE: SIGNIFICANT CHANGE UP
-  TOBRAMYCIN: SIGNIFICANT CHANGE UP
-  TRIMETHOPRIM/SULFAMETHOXAZOLE: SIGNIFICANT CHANGE UP
CULTURE RESULTS: ABNORMAL
METHOD TYPE: SIGNIFICANT CHANGE UP
ORGANISM # SPEC MICROSCOPIC CNT: ABNORMAL
ORGANISM # SPEC MICROSCOPIC CNT: SIGNIFICANT CHANGE UP
SPECIMEN SOURCE: SIGNIFICANT CHANGE UP